# Patient Record
Sex: MALE | Race: WHITE | NOT HISPANIC OR LATINO | Employment: FULL TIME | URBAN - METROPOLITAN AREA
[De-identification: names, ages, dates, MRNs, and addresses within clinical notes are randomized per-mention and may not be internally consistent; named-entity substitution may affect disease eponyms.]

---

## 2018-05-29 ENCOUNTER — APPOINTMENT (OUTPATIENT)
Dept: GENERAL RADIOLOGY | Facility: CLINIC | Age: 28
End: 2018-05-29
Attending: EMERGENCY MEDICINE
Payer: COMMERCIAL

## 2018-05-29 ENCOUNTER — HOSPITAL ENCOUNTER (EMERGENCY)
Facility: CLINIC | Age: 28
Discharge: HOME | End: 2018-05-29
Attending: EMERGENCY MEDICINE | Admitting: EMERGENCY MEDICINE
Payer: COMMERCIAL

## 2018-05-29 VITALS
BODY MASS INDEX: 33.27 KG/M2 | HEIGHT: 67 IN | DIASTOLIC BLOOD PRESSURE: 88 MMHG | SYSTOLIC BLOOD PRESSURE: 142 MMHG | RESPIRATION RATE: 18 BRPM | TEMPERATURE: 100.1 F | WEIGHT: 212 LBS | OXYGEN SATURATION: 96 %

## 2018-05-29 DIAGNOSIS — J45.21 MILD INTERMITTENT ASTHMA WITH EXACERBATION: ICD-10-CM

## 2018-05-29 DIAGNOSIS — R05.9 COUGH: ICD-10-CM

## 2018-05-29 DIAGNOSIS — R25.2 CRAMP OF LIMB: ICD-10-CM

## 2018-05-29 LAB
ALBUMIN SERPL-MCNC: 4 G/DL (ref 3.4–5)
ALBUMIN UR-MCNC: NEGATIVE MG/DL
ALP SERPL-CCNC: 88 U/L (ref 40–150)
ALT SERPL W P-5'-P-CCNC: 47 U/L (ref 0–70)
ANION GAP SERPL CALCULATED.3IONS-SCNC: 8 MMOL/L (ref 3–14)
APPEARANCE UR: CLEAR
AST SERPL W P-5'-P-CCNC: 27 U/L (ref 0–45)
BASOPHILS # BLD AUTO: 0.1 10E9/L (ref 0–0.2)
BASOPHILS NFR BLD AUTO: 0.7 %
BILIRUB SERPL-MCNC: 0.4 MG/DL (ref 0.2–1.3)
BILIRUB UR QL STRIP: NEGATIVE
BUN SERPL-MCNC: 16 MG/DL (ref 7–30)
CALCIUM SERPL-MCNC: 8.9 MG/DL (ref 8.5–10.1)
CHLORIDE SERPL-SCNC: 102 MMOL/L (ref 94–109)
CO2 SERPL-SCNC: 28 MMOL/L (ref 20–32)
COLOR UR AUTO: YELLOW
CREAT SERPL-MCNC: 0.95 MG/DL (ref 0.66–1.25)
DIFFERENTIAL METHOD BLD: NORMAL
EOSINOPHIL # BLD AUTO: 0.1 10E9/L (ref 0–0.7)
EOSINOPHIL NFR BLD AUTO: 1.1 %
ERYTHROCYTE [DISTWIDTH] IN BLOOD BY AUTOMATED COUNT: 14 % (ref 10–15)
GFR SERPL CREATININE-BSD FRML MDRD: >90 ML/MIN/1.7M2
GLUCOSE SERPL-MCNC: 91 MG/DL (ref 70–99)
GLUCOSE UR STRIP-MCNC: NEGATIVE MG/DL
HCT VFR BLD AUTO: 42.1 % (ref 40–53)
HGB BLD-MCNC: 13.8 G/DL (ref 13.3–17.7)
HGB UR QL STRIP: NEGATIVE
IMM GRANULOCYTES # BLD: 0 10E9/L (ref 0–0.4)
IMM GRANULOCYTES NFR BLD: 0.6 %
KETONES UR STRIP-MCNC: NEGATIVE MG/DL
LEUKOCYTE ESTERASE UR QL STRIP: NEGATIVE
LIPASE SERPL-CCNC: 168 U/L (ref 73–393)
LYMPHOCYTES # BLD AUTO: 1.1 10E9/L (ref 0.8–5.3)
LYMPHOCYTES NFR BLD AUTO: 14.5 %
MCH RBC QN AUTO: 28 PG (ref 26.5–33)
MCHC RBC AUTO-ENTMCNC: 32.8 G/DL (ref 31.5–36.5)
MCV RBC AUTO: 86 FL (ref 78–100)
MONOCYTES # BLD AUTO: 0.7 10E9/L (ref 0–1.3)
MONOCYTES NFR BLD AUTO: 10.2 %
NEUTROPHILS # BLD AUTO: 5.3 10E9/L (ref 1.6–8.3)
NEUTROPHILS NFR BLD AUTO: 72.9 %
NITRATE UR QL: NEGATIVE
PH UR STRIP: 7 PH (ref 5–7)
PLATELET # BLD AUTO: 208 10E9/L (ref 150–450)
POTASSIUM SERPL-SCNC: 3.9 MMOL/L (ref 3.4–5.3)
PROT SERPL-MCNC: 7.4 G/DL (ref 6.8–8.8)
RBC # BLD AUTO: 4.92 10E12/L (ref 4.4–5.9)
SODIUM SERPL-SCNC: 138 MMOL/L (ref 133–144)
SOURCE: NORMAL
SP GR UR STRIP: 1.02 (ref 1–1.03)
UROBILINOGEN UR STRIP-MCNC: 0 MG/DL (ref 0–2)
WBC # BLD AUTO: 7.2 10E9/L (ref 4–11)

## 2018-05-29 PROCEDURE — 25000125 ZZHC RX 250: Performed by: EMERGENCY MEDICINE

## 2018-05-29 PROCEDURE — 96360 HYDRATION IV INFUSION INIT: CPT | Performed by: EMERGENCY MEDICINE

## 2018-05-29 PROCEDURE — 71046 X-RAY EXAM CHEST 2 VIEWS: CPT

## 2018-05-29 PROCEDURE — 99284 EMERGENCY DEPT VISIT MOD MDM: CPT | Mod: 25 | Performed by: EMERGENCY MEDICINE

## 2018-05-29 PROCEDURE — 81003 URINALYSIS AUTO W/O SCOPE: CPT | Performed by: EMERGENCY MEDICINE

## 2018-05-29 PROCEDURE — 99284 EMERGENCY DEPT VISIT MOD MDM: CPT | Mod: Z6 | Performed by: EMERGENCY MEDICINE

## 2018-05-29 PROCEDURE — 25000128 H RX IP 250 OP 636: Performed by: EMERGENCY MEDICINE

## 2018-05-29 PROCEDURE — 40000275 ZZH STATISTIC RCP TIME EA 10 MIN

## 2018-05-29 PROCEDURE — 94640 AIRWAY INHALATION TREATMENT: CPT

## 2018-05-29 PROCEDURE — 83690 ASSAY OF LIPASE: CPT | Performed by: EMERGENCY MEDICINE

## 2018-05-29 PROCEDURE — 85025 COMPLETE CBC W/AUTO DIFF WBC: CPT | Performed by: EMERGENCY MEDICINE

## 2018-05-29 PROCEDURE — 80053 COMPREHEN METABOLIC PANEL: CPT | Performed by: EMERGENCY MEDICINE

## 2018-05-29 RX ORDER — PREDNISONE 20 MG/1
40 TABLET ORAL DAILY
Qty: 10 TABLET | Refills: 0 | Status: SHIPPED | OUTPATIENT
Start: 2018-05-29 | End: 2018-06-03

## 2018-05-29 RX ORDER — IPRATROPIUM BROMIDE AND ALBUTEROL SULFATE 2.5; .5 MG/3ML; MG/3ML
3 SOLUTION RESPIRATORY (INHALATION) ONCE
Status: COMPLETED | OUTPATIENT
Start: 2018-05-29 | End: 2018-05-29

## 2018-05-29 RX ADMIN — SODIUM CHLORIDE 1000 ML: 9 INJECTION, SOLUTION INTRAVENOUS at 15:50

## 2018-05-29 RX ADMIN — IPRATROPIUM BROMIDE AND ALBUTEROL SULFATE 3 ML: .5; 3 SOLUTION RESPIRATORY (INHALATION) at 15:59

## 2018-05-29 ASSESSMENT — ENCOUNTER SYMPTOMS
DYSURIA: 0
VOMITING: 1
ABDOMINAL PAIN: 0
NAUSEA: 0
DIFFICULTY URINATING: 0
COUGH: 1

## 2018-05-29 NOTE — ED PROVIDER NOTES
History     Chief Complaint   Patient presents with     Dehydration     cough, muscle cramps, dizzy, weak     HPI  Gianluca Ortiz is a 27 year old male who presents to the emergency department via private car for evaluation of possible dehydration.  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  Due to the recent heat he has been drinking 2 bottles of Body Armor and using a gallon jug to drink water.  He believes he is dehydrated and left work to come to the emergency department.  He feels he should be evaluated before any loss of consciousness happens.  He also reports a cough that began 4 days ago.  He had one episode of coughing so hard he vomited but does not state when this was. His cough is producing a white phlegm and causing him to see stars and get light-heading.  His symptoms are accompanied with muscle cramps in his back and legs.  Patient reports history of asthma with an inhaler use.  He has used this recently but does not find relief.  Patient denies urinary symptoms, abdominal pain and chest pain . He also states he had an episode of dysuria yesterday.     Problem List:    There are no active problems to display for this patient.       Past Medical History:    No past medical history on file.    Past Surgical History:    No past surgical history on file.    Family History:    No family history on file.    Social History:  Marital Status:  Single [1]  Social History   Substance Use Topics     Smoking status: Former Smoker     Packs/day: 0.50     Years: 5.00     Types: Cigarettes     Smokeless tobacco: Current User     Alcohol use Yes        Medications:      amoxicillin-clavulanate (AUGMENTIN) 875-125 MG per tablet   diphenhydrAMINE (BENADRYL) 25 MG tablet   fluticasone (FLONASE) 50 MCG/ACT nasal spray   levocetirizine (XYZAL) 5 MG tablet         Review of Systems   Constitutional: Positive for activity change and fatigue. Negative for appetite change, chills, diaphoresis and fever.  "  HENT: Negative for congestion and trouble swallowing.    Respiratory: Positive for cough and wheezing. Negative for chest tightness and shortness of breath.    Cardiovascular: Negative for chest pain.   Gastrointestinal: Positive for vomiting (once due to cough). Negative for abdominal pain and nausea.   Genitourinary: Negative for decreased urine volume, difficulty urinating and dysuria.   Musculoskeletal: Positive for myalgias. Negative for arthralgias, gait problem and neck pain.   Skin: Negative for rash.   Neurological: Positive for light-headedness. Negative for dizziness, weakness, numbness and headaches.   Hematological: Does not bruise/bleed easily.   Psychiatric/Behavioral: Negative for confusion.       Physical Exam   BP: 141/81  Heart Rate: 104  Temp: 100.1  F (37.8  C)  Resp: 18  Height: 170.2 cm (5' 7\")  Weight: 96.2 kg (212 lb)  SpO2: 96 %      Physical Exam   Constitutional: He appears well-developed and well-nourished. No distress.   Eyes: Conjunctivae are normal.   Psychiatric: He has a normal mood and affect.   Nursing note and vitals reviewed.    HENT: Oral mucosa moist. No lesions.  Neck: Supple  Pulmonary/Chest: a wheeze in right upper lung with breath sounds slightly decrease at bases.  Cardiovascular: Heart is regular rate and rhythm. No murmur.  Abdomen: Soft, non-distended, non-tender.   Musculoskeletal: Moving all extremities well. No peripheral edema.   Neurological: Alert. No focal neurologic deficit.   Skin: No rash.   ED Course     ED Course     Procedures               Critical Care time:  none               Results for orders placed or performed during the hospital encounter of 05/29/18 (from the past 24 hour(s))   CBC with platelets, differential   Result Value Ref Range    WBC 7.2 4.0 - 11.0 10e9/L    RBC Count 4.92 4.4 - 5.9 10e12/L    Hemoglobin 13.8 13.3 - 17.7 g/dL    Hematocrit 42.1 40.0 - 53.0 %    MCV 86 78 - 100 fl    MCH 28.0 26.5 - 33.0 pg    MCHC 32.8 31.5 - 36.5 g/dL "    RDW 14.0 10.0 - 15.0 %    Platelet Count 208 150 - 450 10e9/L    Diff Method Automated Method     % Neutrophils 72.9 %    % Lymphocytes 14.5 %    % Monocytes 10.2 %    % Eosinophils 1.1 %    % Basophils 0.7 %    % Immature Granulocytes 0.6 %    Absolute Neutrophil 5.3 1.6 - 8.3 10e9/L    Absolute Lymphocytes 1.1 0.8 - 5.3 10e9/L    Absolute Monocytes 0.7 0.0 - 1.3 10e9/L    Absolute Eosinophils 0.1 0.0 - 0.7 10e9/L    Absolute Basophils 0.1 0.0 - 0.2 10e9/L    Abs Immature Granulocytes 0.0 0 - 0.4 10e9/L   Comprehensive metabolic panel   Result Value Ref Range    Sodium 138 133 - 144 mmol/L    Potassium 3.9 3.4 - 5.3 mmol/L    Chloride 102 94 - 109 mmol/L    Carbon Dioxide 28 20 - 32 mmol/L    Anion Gap 8 3 - 14 mmol/L    Glucose 91 70 - 99 mg/dL    Urea Nitrogen 16 7 - 30 mg/dL    Creatinine 0.95 0.66 - 1.25 mg/dL    GFR Estimate >90 >60 mL/min/1.7m2    GFR Estimate If Black >90 >60 mL/min/1.7m2    Calcium 8.9 8.5 - 10.1 mg/dL    Bilirubin Total 0.4 0.2 - 1.3 mg/dL    Albumin 4.0 3.4 - 5.0 g/dL    Protein Total 7.4 6.8 - 8.8 g/dL    Alkaline Phosphatase 88 40 - 150 U/L    ALT 47 0 - 70 U/L    AST 27 0 - 45 U/L   Lipase   Result Value Ref Range    Lipase 168 73 - 393 U/L   Chest XR,  PA & LAT    Narrative    CHEST TWO VIEWS 5/29/2018 4:13 PM     HISTORY: Cough.    COMPARISON: None.    FINDINGS: Heart size and pulmonary vascularity are within normal  limits. The lungs are clear. No pneumothorax or pleural effusion.       Impression    IMPRESSION: No radiographic evidence of acute chest abnormality.     SAILAJA MORRIS MD   UA reflex to Microscopic   Result Value Ref Range    Color Urine Yellow     Appearance Urine Clear     Glucose Urine Negative NEG^Negative mg/dL    Bilirubin Urine Negative NEG^Negative    Ketones Urine Negative NEG^Negative mg/dL    Specific Gravity Urine 1.023 1.003 - 1.035    Blood Urine Negative NEG^Negative    pH Urine 7.0 5.0 - 7.0 pH    Protein Albumin Urine Negative NEG^Negative mg/dL     Urobilinogen mg/dL 0.0 0.0 - 2.0 mg/dL    Nitrite Urine Negative NEG^Negative    Leukocyte Esterase Urine Negative NEG^Negative    Source Midstream Urine        Medications   ipratropium - albuterol 0.5 mg/2.5 mg/3 mL (DUONEB) neb solution 3 mL (3 mLs Nebulization Given 5/29/18 2880)   0.9% sodium chloride BOLUS (0 mLs Intravenous Stopped 5/29/18 2615)     3:19 PM Patient Assessed.  Assessments & Plan (with Medical Decision Making)records were reviewed. Labs were obtained. Patient was given a duoneb for his wheezing. IVF's were given to the patient. White count was not elevated and there was not a L shift. UA was unremarkable and the cmp was without abnormality. CXR without radiographic evidence of acute chest abnormality. Patient was observed for some time. He had no further wheezing on ascultation. HE was feeling better after the fluids.Findings discussed with patient. I am going to start his on a course of prednisone and have him use his inhaler . THis could be seasonal allergies . He may have had mild heat exposure but does not have any lab abnormalities. He will return if symptoms worsen or new symptoms develop.      I have reviewed the nursing notes.    I have reviewed the findings, diagnosis, plan and need for follow up with the patient.       Discharge Medication List as of 5/29/2018  5:41 PM      START taking these medications    Details   predniSONE (DELTASONE) 20 MG tablet Take 2 tablets (40 mg) by mouth daily for 5 days, Disp-10 tablet, R-0, Local Print             Final diagnoses:   Cough   Mild intermittent asthma with exacerbation   Cramp of limb     This document serves as a record of the services and decisions personally performed and made by Edvin Bains MD. It was created on HIS/HER behalf by Daysi Luque, a trained medical scribe. The creation of this document is based the provider's statements to the medical scribe.  Daysi Luque 3:14 PM 5/29/2018    Provider:   The information in  this document, created by the medical scribe for me, accurately reflects the services I personally performed and the decisions made by me. I have reviewed and approved this document for accuracy prior to leaving the patient care area.  Edvin Bains MD 3:14 PM 5/29/2018 5/29/2018   [facility] EMERGENCY DEPARTMENT     Edvin Bains MD  05/30/18 0907

## 2018-05-29 NOTE — ED NOTES
"Pt here with a cough that started on Friday and feeling weak/fatigued. Cough is producing white phlegm. Pt states he has been drinking 1 gallon of water per day but still feels dehydrated- getting muscle cramps. He states when he coughs he \"sees stars and gets lightheaded\". Pt is a - outside a lot. Hx of asthma- uses rescue inhaler that hasn't helped his breathing.  "

## 2018-05-29 NOTE — ED AVS SNAPSHOT
[facility] Emergency Department    [address] [facility] [address]    Phone:  [phone]    Fax:  [phone]                                       Gianluca Ortiz   MRN: [id number]    Department:  [facility] Emergency Department   Date of Visit:  5/29/2018           Patient Information     Date Of Birth          1990        Your diagnoses for this visit were:     Cough     Mild intermittent asthma with exacerbation     Cramp of limb        You were seen by Edvin Bains MD.      Follow-up Information     Follow up with Miroslava Restrepo APRN CNP.    Specialty:  Nurse Practitioner    Why:  Later this week for recheck    Contact information:    [address] [place] [address]  [phone]          Follow up with [facility] Emergency Department.    Specialty:  EMERGENCY MEDICINE    Why:  If symptoms worsen    Contact information:    [address] [place] [address] [phone]    Additional information:    The medical center is located at   [address] [place] [address] (between [address] in [place], four miles north   of [place]).        Discharge Instructions       Return if symptoms worsen or new symptoms develop.  Follow-up with primary care physician next available.  Drink plenty of fluids.  Take prednisone as directed.  If increased shortness of breath, wheezing, body aches or other symptoms present please return for further evaluation and care.  Asthma (Adult)  Asthma is a disease where the medium and  small air passages within the lung go into spasm and restrict the flow of air. Inflammation and swelling of the airways cause further blockage. During an acute asthma attack, these factors cause trouble breathing, wheezing, cough and chest tightness.    An asthma attack can be triggered by many things. Common triggers include infections such as the common cold, bronchitis, and pneumonia. Irritants such as smoke or pollutants in the air,  "very cold air, emotional upset, and exercise can also trigger an attack. In many adults with asthma, allergies to dust, mold, pollen and animal dander can cause an asthma attack. Skipping doses of daily asthma medicine can also bring on an asthma attack.  Asthma can be controlled using the proper medicines prescribed by your healthcare provider and avoiding exposure to known triggers including allergens and irritants.  Home care    Take prescribed medicine exactly at the times advised. If you need medicine such as from a hand held inhaler or aerosol breathing machine more than every 4 hours, contact your healthcare provider or seek immediate medical attention. If prescribed an antibiotic or prednisone, take all of the medicine as prescribed, even if you are feeling better after a few days.    Don't smoke. Avoid being exposed to the smoke of others.    Some people with asthma have worsening of their symptoms when they take aspirin and non-steroidal or fever-reducing medicines like ibuprofen and naproxen. Talk to your healthcare provider if you think this may apply to you.  Follow-up care  Follow up with your healthcare provider, or as advised. Always bring all of your current medicines to any appointments with your healthcare provider. Also bring a complete list of medicines even those not taken for asthma. If you don't already have one, talk to your healthcare provider about developing your own \"Asthma Action Plan.\"  A pneumococcal (pneumonia) vaccine and yearly flu shot (every fall) are recommended. Ask your doctor about this.  When to seek medical advice  Call your healthcare provider right away if any of these occur:     Increased wheezing or shortness of breath    Need to use your inhalers more often than usual without relief    Fever of 100.4 F (38 C) or higher, or as directed by your healthcare provider    Coughing up lots of dark-colored or bloody sputum (mucus)    Chest pain with each breath    If you use a " peak flow meter as part of an Asthma Action Plan, and you are still in the yellow zone (50% to 80%) 15 minutes after using inhaler medicine.  Call 911  Call 911 if any of the following occur    Trouble walking or talking because of shortness of breath    If you use a peak flow meter as part of an Asthma Action Plan and you are still in the red zone (less than 50%) 15 minutes after using inhaler medicine    Lips or fingernails turning gray or blue  Date Last Reviewed: 5/1/2017 2000-2017 [employer]. [address] [place] [id number]. All rights reserved. This information is not intended as a substitute for professional medical care. Always follow your healthcare professional's instructions.          Heat Cramps  Heat cramps are muscle cramps caused by intense exercise. They often occur in the heat, but can also occur in cooler temperatures. They are sometimes called  exercise-associated muscle cramps.  Symptoms include cramping of the muscles that comes on suddenly and causes severe pain. Cramping may last from minutes to hours. The cramps are thought to be caused by dehydration and loss of minerals in the body due to excessive sweating, but other factors can be involved. They are more common in people who are not used to heavy exercise or who are not used to exercising in hot or humid temperatures.  Heat cramps are treated by moving to a cool place, stretching the muscle, and drinking fluids containing salt. Severe heat cramps may need to be treated using IV (intravenous) fluids.  Home care    If cramping continues, drink electrolyte solution, sports drinks, or water with 2 teaspoons of salt per 8 ounces.     Avoid alcohol and caffeine.    Stretch and massage the painful muscle.  Preventing heat cramps:    Stay hydrated during exercise. Drink plenty of fluids containing electrolytes, such as sports drinks. If you are prone to heat cramps, add an extra 1/2 teaspoon of salt to each 8 ounces of  sports drink.    Work up gradually to exercising in hot or very humid conditions. Limit exercise on very hot days.    Avoid alcohol and caffeine before and during exercise.  Preventing other heat illness:    Protect yourself from the heat. Wear lightweight, light-colored clothing and a broad-brimmed hat.    Drink plenty of fluids before and during activity. Avoid alcohol and caffeine.    Limit exercise on hot or very humid days. If you have to be active in the heat, take frequent breaks to drink fluids and cool down.    Don't exercise when you're feeling ill.    Watch for symptoms of heat illness such as extreme tiredness, excess sweating, and dizziness. If any occur, move to a cool place, rest, and drink cool fluids. Lying down with your legs raised slightly can help you recover.  Follow-up care  Follow up with your healthcare provider, or as advised.  When to seek medical advice  Call your healthcare provider right away if any of these occur:    Inability to keep fluids down    Vomiting or diarrhea    Hot flushed skin    Worsening symptoms or new symptoms  Call 911  Call 911 for any of these symptoms of severe heat illness:    Confusion    Irrational behavior    Hallucinations    Trouble walking    Seizures    Passing out    Fever of 104 F (40 C) or higher  Date Last Reviewed: 5/1/2017 2000-2017 [employer]. [address] [place] [address], [place] [id number]. All rights reserved. This information is not intended as a substitute for professional medical care. Always follow your healthcare professional's instructions.          Chronic Cough with Uncertain Cause (Adult)    Everyone has had a cough as part of the common cold, flu, or bronchitis. This kind of cough occurs along with an achy feeling, low-grade fever, nasal and sinus congestion, and a scratchy or sore throat. This usually gets better in 2 to 3 weeks. A cough that lasts longer than 3 weeks may be due to other causes.  If your cough does not  improve over the next 2 weeks, further testing may be needed. Follow up with your healthcare provider as advised. Cough suppressants may be recommended. Based on your exam today, the exact cause of your cough is not certain. Below are some common causes for persistent cough.  Smoker's cough  Smoker s cough doesn t go away. If you continue to smoke, it only gets worse. The cough is from irritation in the air passages. Talk to your healthcare provider about quitting. Medicines or nicotine-replacement products, like gum or the patch, may make quitting easier.  Postnasal drip  A cough that is worse at night may be due to postnasal drip. Excess mucus in the nose drains from the back of your nose to your throat. This triggers the cough reflex. Postnasal drip may be due to a sinus infection or allergy. Common allergens include dust, tobacco smoke (both inhaled and secondhand smoke), environmental pollutants, pollen, mold, pets, cleaning agents, room deodorizers, and chemical fumes. Over-the-counter antihistamines or decongestants may be helpful for allergies. A sinus infection may requires antibiotic treatment. See your healthcare provider if symptoms continue.  Medicines  Certain prescribed medicines can cause a chronic cough in some people:    ACE inhibitors for high blood pressure. These include benazepril, captopril, enalapril, fosinopril, lisinopril, quinapril, ramipril, and others.    Beta-blockers for high blood pressure and other conditions. These include propranolol, atenolol, metoprolol, nadolol, and others.  Let your healthcare provider know if you are taking any of these.  Asthma  Cough may be the only sign of mild asthma. You may have tests to find out if asthma is causing your cough. You may also take asthma medicine on a trial basis.  Acid reflux (heartburn, GERD)  The esophagus is the tube that carries food from the mouth to the stomach. A valve at its lower end prevents stomach acids from flowing upward.  If this valve does not work properly, acid from the stomach enters the esophagus. This may cause a burning pain in the upper abdomen or lower chest, belching, or cough. Symptoms are often worse when lying flat. Avoid eating or drinking before bedtime. Try using extra pillows to raise your upper body, or place 4-inch blocks under the head of your bed. You may try an over-the-counter antacid or an acid-blocking medicine such as famotidine, cimetidine, ranitidine, esomeprazole, lansoprazole, or omeprazole. Stronger medicines for this condition can be prescribed by your healthcare provider.  Follow-up care  Follow up with your healthcare provider, or as advised, if your cough does not improve. Further testing may be needed.  Note: If an X-ray was taken, a specialist will review it. You will be notified of any new findings that may affect your care.  When to seek medical advice  Call your healthcare provider right away if any of these occur:    Mild wheezing or difficulty breathing    Fever of 100.4 F (38 C) or higher, or as directed by your healthcare provider    Unexpected weight loss    Coughing up large amounts of colored sputum    Night sweats (sheets and pajamas get soaking wet)  Call 911  Call 911 if any of these occur:    Coughing up blood    Moderate to severe trouble breathing or wheezing  Date Last Reviewed: 9/13/2015 2000-2017 [employer]. [address] [place] [address]. All rights reserved. This information is not intended as a substitute for professional medical care. Always follow your healthcare professional's instructions.          24 Hour Appointment Hotline       To make an appointment at any [facility] clinic, call [phone] ([phone]). If you don't have a family doctor or clinic, we will help you find one. [facility] clinics are conveniently located to serve the needs of you and your family.             Review of your medicines      START taking        Dose /  Directions Last dose taken    predniSONE 20 MG tablet   Commonly known as:  DELTASONE   Dose:  40 mg   Quantity:  10 tablet        Take 2 tablets (40 mg) by mouth daily for 5 days   Refills:  0          Our records show that you are taking the medicines listed below. If these are incorrect, please call your family doctor or clinic.        Dose / Directions Last dose taken    amoxicillin-clavulanate 875-125 MG per tablet   Commonly known as:  AUGMENTIN   Dose:  1 tablet   Quantity:  20 tablet        Take 1 tablet by mouth 2 times daily   Refills:  0        diphenhydrAMINE 25 MG tablet   Commonly known as:  BENADRYL   Dose:  25 mg   Quantity:  30 tablet        Take 1 tablet by mouth every 6 hours as needed for itching.   Refills:  0        fluticasone 50 MCG/ACT spray   Commonly known as:  FLONASE   Dose:  2 spray   Quantity:  16 g        Spray 2 sprays into both nostrils daily   Refills:  0        levocetirizine 5 MG tablet   Commonly known as:  XYZAL   Dose:  5 mg   Quantity:  30 tablet        Take 1 tablet (5 mg) by mouth every evening   Refills:  1                Prescriptions were sent or printed at these locations (1 Prescription)                   Other Prescriptions                Printed at Department/Unit printer (1 of 1)         predniSONE (DELTASONE) 20 MG tablet                Procedures and tests performed during your visit     CBC with platelets, differential    Chest XR,  PA & LAT    Comprehensive metabolic panel    Lipase    UA reflex to Microscopic      Orders Needing Specimen Collection     None      Pending Results     No orders found from 5/27/2018 to 5/30/2018.            Pending Culture Results     No orders found from 5/27/2018 to 5/30/2018.            Pending Results Instructions     If you had any lab results that were not finalized at the time of your Discharge, you can call the ED Lab Result RN at [phone]. You will be contacted by this team for any positive Lab results or changes in  treatment. The nurses are available 7 days a week from 10A to 6:30P.  You can leave a message 24 hours per day and they will return your call.        Test Results From Your Hospital Stay        5/29/2018  4:20 PM      Narrative     CHEST TWO VIEWS 5/29/2018 4:13 PM     HISTORY: Cough.    COMPARISON: None.    FINDINGS: Heart size and pulmonary vascularity are within normal  limits. The lungs are clear. No pneumothorax or pleural effusion.         Impression     IMPRESSION: No radiographic evidence of acute chest abnormality.     SAILAJA MORRIS MD         5/29/2018  3:50 PM      Component Results     Component Value Ref Range & Units Status    WBC 7.2 4.0 - 11.0 10e9/L Final    RBC Count 4.92 4.4 - 5.9 10e12/L Final    Hemoglobin 13.8 13.3 - 17.7 g/dL Final    Hematocrit 42.1 40.0 - 53.0 % Final    MCV 86 78 - 100 fl Final    MCH 28.0 26.5 - 33.0 pg Final    MCHC 32.8 31.5 - 36.5 g/dL Final    RDW 14.0 10.0 - 15.0 % Final    Platelet Count 208 150 - 450 10e9/L Final    Diff Method Automated Method  Final    % Neutrophils 72.9 % Final    % Lymphocytes 14.5 % Final    % Monocytes 10.2 % Final    % Eosinophils 1.1 % Final    % Basophils 0.7 % Final    % Immature Granulocytes 0.6 % Final    Absolute Neutrophil 5.3 1.6 - 8.3 10e9/L Final    Absolute Lymphocytes 1.1 0.8 - 5.3 10e9/L Final    Absolute Monocytes 0.7 0.0 - 1.3 10e9/L Final    Absolute Eosinophils 0.1 0.0 - 0.7 10e9/L Final    Absolute Basophils 0.1 0.0 - 0.2 10e9/L Final    Abs Immature Granulocytes 0.0 0 - 0.4 10e9/L Final         5/29/2018  4:11 PM      Component Results     Component Value Ref Range & Units Status    Sodium 138 133 - 144 mmol/L Final    Potassium 3.9 3.4 - 5.3 mmol/L Final    Chloride 102 94 - 109 mmol/L Final    Carbon Dioxide 28 20 - 32 mmol/L Final    Anion Gap 8 3 - 14 mmol/L Final    Glucose 91 70 - 99 mg/dL Final    Urea Nitrogen 16 7 - 30 mg/dL Final    Creatinine 0.95 0.66 - 1.25 mg/dL Final    GFR Estimate >90 >60 mL/min/1.7m2  "Final    Non  GFR Calc    GFR Estimate If Black >90 >60 mL/min/1.7m2 Final    African American GFR Calc    Calcium 8.9 8.5 - 10.1 mg/dL Final    Bilirubin Total 0.4 0.2 - 1.3 mg/dL Final    Albumin 4.0 3.4 - 5.0 g/dL Final    Protein Total 7.4 6.8 - 8.8 g/dL Final    Alkaline Phosphatase 88 40 - 150 U/L Final    ALT 47 0 - 70 U/L Final    AST 27 0 - 45 U/L Final         5/29/2018  4:09 PM      Component Results     Component Value Ref Range & Units Status    Lipase 168 73 - 393 U/L Final         5/29/2018  5:06 PM      Component Results     Component Value Ref Range & Units Status    Color Urine Yellow  Final    Appearance Urine Clear  Final    Glucose Urine Negative NEG^Negative mg/dL Final    Bilirubin Urine Negative NEG^Negative Final    Ketones Urine Negative NEG^Negative mg/dL Final    Specific Gravity Urine 1.023 1.003 - 1.035 Final    Blood Urine Negative NEG^Negative Final    pH Urine 7.0 5.0 - 7.0 pH Final    Protein Albumin Urine Negative NEG^Negative mg/dL Final    Urobilinogen mg/dL 0.0 0.0 - 2.0 mg/dL Final    Nitrite Urine Negative NEG^Negative Final    Leukocyte Esterase Urine Negative NEG^Negative Final    Source Midstream Urine  Final                Thank you for choosing [facility]       Thank you for choosing [facility] for your care. Our goal is always to provide you with excellent care. Hearing back from our patients is one way we can continue to improve our services. Please take a few minutes to complete the written survey that you may receive in the mail after you visit with us. Thank you!        [employer] Information     [facility] lets you send messages to your doctor, view your test results, renew your prescriptions, schedule appointments and more. To sign up, go to [email or web address] . Click on \"Log in\" on the left side of the screen, which will take you to the Welcome page. Then click on \"Sign up Now\" on the right side of the page.     You will be asked to enter the access " code listed below, as well as some personal information. Please follow the directions to create your username and password.     Your access code is: [email or web address]  Expires: 2018  5:41 PM     Your access code will  in 90 days. If you need help or a new code, please call your [facility] clinic or [phone].        Care EveryWhere ID     This is your Care EveryWhere ID. This could be used by other organizations to access your [facility] medical records  [id number]        Equal Access to Services     [facility] : Charlotte Patel, wareshma jones, qasteffany kaalbryanna barreto, memo penny . So [facility] [phone].    ATENCIÓN: Si habla español, tiene a george disposición servicios gratuitos de asistencia lingüística. Llame al [phone].    We comply with applicable federal civil rights laws and [place] laws. We do not discriminate on the basis of race, color, national origin, age, disability, sex, sexual orientation, or gender identity.            After Visit Summary       This is your record. Keep this with you and show to your community pharmacist(s) and doctor(s) at your next visit.

## 2018-05-29 NOTE — DISCHARGE INSTRUCTIONS
Return if symptoms worsen or new symptoms develop.  Follow-up with primary care physician next available.  Drink plenty of fluids.  Take prednisone as directed.  If increased shortness of breath, wheezing, body aches or other symptoms present please return for further evaluation and care.  Asthma (Adult)  Asthma is a disease where the medium and  small air passages within the lung go into spasm and restrict the flow of air. Inflammation and swelling of the airways cause further blockage. During an acute asthma attack, these factors cause trouble breathing, wheezing, cough and chest tightness.    An asthma attack can be triggered by many things. Common triggers include infections such as the common cold, bronchitis, and pneumonia. Irritants such as smoke or pollutants in the air, very cold air, emotional upset, and exercise can also trigger an attack. In many adults with asthma, allergies to dust, mold, pollen and animal dander can cause an asthma attack. Skipping doses of daily asthma medicine can also bring on an asthma attack.  Asthma can be controlled using the proper medicines prescribed by your healthcare provider and avoiding exposure to known triggers including allergens and irritants.  Home care    Take prescribed medicine exactly at the times advised. If you need medicine such as from a hand held inhaler or aerosol breathing machine more than every 4 hours, contact your healthcare provider or seek immediate medical attention. If prescribed an antibiotic or prednisone, take all of the medicine as prescribed, even if you are feeling better after a few days.    Don't smoke. Avoid being exposed to the smoke of others.    Some people with asthma have worsening of their symptoms when they take aspirin and non-steroidal or fever-reducing medicines like ibuprofen and naproxen. Talk to your healthcare provider if you think this may apply to you.  Follow-up care  Follow up with your healthcare provider, or as  "advised. Always bring all of your current medicines to any appointments with your healthcare provider. Also bring a complete list of medicines even those not taken for asthma. If you don't already have one, talk to your healthcare provider about developing your own \"Asthma Action Plan.\"  A pneumococcal (pneumonia) vaccine and yearly flu shot (every fall) are recommended. Ask your doctor about this.  When to seek medical advice  Call your healthcare provider right away if any of these occur:     Increased wheezing or shortness of breath    Need to use your inhalers more often than usual without relief    Fever of 100.4 F (38 C) or higher, or as directed by your healthcare provider    Coughing up lots of dark-colored or bloody sputum (mucus)    Chest pain with each breath    If you use a peak flow meter as part of an Asthma Action Plan, and you are still in the yellow zone (50% to 80%) 15 minutes after using inhaler medicine.  Call 911  Call 911 if any of the following occur    Trouble walking or talking because of shortness of breath    If you use a peak flow meter as part of an Asthma Action Plan and you are still in the red zone (less than 50%) 15 minutes after using inhaler medicine    Lips or fingernails turning gray or blue  Date Last Reviewed: 5/1/2017 2000-2017 [employer]. [address] [place] [address], [place] [address]. All rights reserved. This information is not intended as a substitute for professional medical care. Always follow your healthcare professional's instructions.          Heat Cramps  Heat cramps are muscle cramps caused by intense exercise. They often occur in the heat, but can also occur in cooler temperatures. They are sometimes called  exercise-associated muscle cramps.  Symptoms include cramping of the muscles that comes on suddenly and causes severe pain. Cramping may last from minutes to hours. The cramps are thought to be caused by dehydration and loss of minerals in the body " due to excessive sweating, but other factors can be involved. They are more common in people who are not used to heavy exercise or who are not used to exercising in hot or humid temperatures.  Heat cramps are treated by moving to a cool place, stretching the muscle, and drinking fluids containing salt. Severe heat cramps may need to be treated using IV (intravenous) fluids.  Home care    If cramping continues, drink electrolyte solution, sports drinks, or water with 2 teaspoons of salt per 8 ounces.     Avoid alcohol and caffeine.    Stretch and massage the painful muscle.  Preventing heat cramps:    Stay hydrated during exercise. Drink plenty of fluids containing electrolytes, such as sports drinks. If you are prone to heat cramps, add an extra 1/2 teaspoon of salt to each 8 ounces of sports drink.    Work up gradually to exercising in hot or very humid conditions. Limit exercise on very hot days.    Avoid alcohol and caffeine before and during exercise.  Preventing other heat illness:    Protect yourself from the heat. Wear lightweight, light-colored clothing and a broad-brimmed hat.    Drink plenty of fluids before and during activity. Avoid alcohol and caffeine.    Limit exercise on hot or very humid days. If you have to be active in the heat, take frequent breaks to drink fluids and cool down.    Don't exercise when you're feeling ill.    Watch for symptoms of heat illness such as extreme tiredness, excess sweating, and dizziness. If any occur, move to a cool place, rest, and drink cool fluids. Lying down with your legs raised slightly can help you recover.  Follow-up care  Follow up with your healthcare provider, or as advised.  When to seek medical advice  Call your healthcare provider right away if any of these occur:    Inability to keep fluids down    Vomiting or diarrhea    Hot flushed skin    Worsening symptoms or new symptoms  Call 911  Call 911 for any of these symptoms of severe heat  illness:    Confusion    Irrational behavior    Hallucinations    Trouble walking    Seizures    Passing out    Fever of 104 F (40 C) or higher  Date Last Reviewed: 5/1/2017 2000-2017 [employer]. [address] [place] [address], [place] [address]. All rights reserved. This information is not intended as a substitute for professional medical care. Always follow your healthcare professional's instructions.          Chronic Cough with Uncertain Cause (Adult)    Everyone has had a cough as part of the common cold, flu, or bronchitis. This kind of cough occurs along with an achy feeling, low-grade fever, nasal and sinus congestion, and a scratchy or sore throat. This usually gets better in 2 to 3 weeks. A cough that lasts longer than 3 weeks may be due to other causes.  If your cough does not improve over the next 2 weeks, further testing may be needed. Follow up with your healthcare provider as advised. Cough suppressants may be recommended. Based on your exam today, the exact cause of your cough is not certain. Below are some common causes for persistent cough.  Smoker's cough  Smoker s cough doesn t go away. If you continue to smoke, it only gets worse. The cough is from irritation in the air passages. Talk to your healthcare provider about quitting. Medicines or nicotine-replacement products, like gum or the patch, may make quitting easier.  Postnasal drip  A cough that is worse at night may be due to postnasal drip. Excess mucus in the nose drains from the back of your nose to your throat. This triggers the cough reflex. Postnasal drip may be due to a sinus infection or allergy. Common allergens include dust, tobacco smoke (both inhaled and secondhand smoke), environmental pollutants, pollen, mold, pets, cleaning agents, room deodorizers, and chemical fumes. Over-the-counter antihistamines or decongestants may be helpful for allergies. A sinus infection may requires antibiotic treatment. See your healthcare  provider if symptoms continue.  Medicines  Certain prescribed medicines can cause a chronic cough in some people:    ACE inhibitors for high blood pressure. These include benazepril, captopril, enalapril, fosinopril, lisinopril, quinapril, ramipril, and others.    Beta-blockers for high blood pressure and other conditions. These include propranolol, atenolol, metoprolol, nadolol, and others.  Let your healthcare provider know if you are taking any of these.  Asthma  Cough may be the only sign of mild asthma. You may have tests to find out if asthma is causing your cough. You may also take asthma medicine on a trial basis.  Acid reflux (heartburn, GERD)  The esophagus is the tube that carries food from the mouth to the stomach. A valve at its lower end prevents stomach acids from flowing upward. If this valve does not work properly, acid from the stomach enters the esophagus. This may cause a burning pain in the upper abdomen or lower chest, belching, or cough. Symptoms are often worse when lying flat. Avoid eating or drinking before bedtime. Try using extra pillows to raise your upper body, or place 4-inch blocks under the head of your bed. You may try an over-the-counter antacid or an acid-blocking medicine such as famotidine, cimetidine, ranitidine, esomeprazole, lansoprazole, or omeprazole. Stronger medicines for this condition can be prescribed by your healthcare provider.  Follow-up care  Follow up with your healthcare provider, or as advised, if your cough does not improve. Further testing may be needed.  Note: If an X-ray was taken, a specialist will review it. You will be notified of any new findings that may affect your care.  When to seek medical advice  Call your healthcare provider right away if any of these occur:    Mild wheezing or difficulty breathing    Fever of 100.4 F (38 C) or higher, or as directed by your healthcare provider    Unexpected weight loss    Coughing up large amounts of colored  sputum    Night sweats (sheets and pajamas get soaking wet)  Call 911  Call 911 if any of these occur:    Coughing up blood    Moderate to severe trouble breathing or wheezing  Date Last Reviewed: 9/13/2015 2000-2017 [employer]. [address] [place] [address]. All rights reserved. This information is not intended as a substitute for professional medical care. Always follow your healthcare professional's instructions.

## 2018-05-29 NOTE — ED AVS SNAPSHOT
[facility] Emergency Department    [address] [facility] [address]    Phone:  [phone]    Fax:  [phone]                                       Gianluca Ortiz   MRN: [id number]    Department:  [facility] Emergency Department   Date of Visit:  5/29/2018           After Visit Summary Signature Page     I have received my discharge instructions, and my questions have been answered. I have discussed any challenges I see with this plan with the nurse or doctor.    ..........................................................................................................................................  Patient/Patient Representative Signature      ..........................................................................................................................................  Patient Representative Print Name and Relationship to Patient    ..................................................               ................................................  Date                                            Time    ..........................................................................................................................................  Reviewed by Signature/Title    ...................................................              ..............................................  Date                                                            Time

## 2018-05-30 ASSESSMENT — ENCOUNTER SYMPTOMS
CHILLS: 0
DIAPHORESIS: 0
SHORTNESS OF BREATH: 0
WHEEZING: 1
TROUBLE SWALLOWING: 0
BRUISES/BLEEDS EASILY: 0
CHEST TIGHTNESS: 0
HEADACHES: 0
MYALGIAS: 1
DIZZINESS: 0
WEAKNESS: 0
ARTHRALGIAS: 0
FATIGUE: 1
LIGHT-HEADEDNESS: 1
APPETITE CHANGE: 0
CONFUSION: 0
FEVER: 0
NUMBNESS: 0
NECK PAIN: 0
ACTIVITY CHANGE: 1

## 2018-11-19 ENCOUNTER — HOSPITAL ENCOUNTER (EMERGENCY)
Facility: CLINIC | Age: 28
Discharge: HOME | End: 2018-11-19
Attending: NURSE PRACTITIONER | Admitting: NURSE PRACTITIONER
Payer: COMMERCIAL

## 2018-11-19 ENCOUNTER — APPOINTMENT (OUTPATIENT)
Dept: GENERAL RADIOLOGY | Facility: CLINIC | Age: 28
End: 2018-11-19
Attending: NURSE PRACTITIONER
Payer: COMMERCIAL

## 2018-11-19 VITALS
HEIGHT: 66 IN | RESPIRATION RATE: 14 BRPM | HEART RATE: 81 BPM | SYSTOLIC BLOOD PRESSURE: 139 MMHG | BODY MASS INDEX: 34.22 KG/M2 | TEMPERATURE: 98.6 F | OXYGEN SATURATION: 96 % | DIASTOLIC BLOOD PRESSURE: 89 MMHG

## 2018-11-19 DIAGNOSIS — R05.3 PERSISTENT COUGH: ICD-10-CM

## 2018-11-19 DIAGNOSIS — J45.21 MILD INTERMITTENT ASTHMA WITH EXACERBATION: ICD-10-CM

## 2018-11-19 PROCEDURE — G0463 HOSPITAL OUTPT CLINIC VISIT: HCPCS | Mod: 25 | Performed by: NURSE PRACTITIONER

## 2018-11-19 PROCEDURE — 99214 OFFICE O/P EST MOD 30 MIN: CPT | Mod: Z6 | Performed by: NURSE PRACTITIONER

## 2018-11-19 PROCEDURE — 71046 X-RAY EXAM CHEST 2 VIEWS: CPT

## 2018-11-19 RX ORDER — PREDNISONE 20 MG/1
60 TABLET ORAL DAILY
Qty: 15 TABLET | Refills: 0 | Status: SHIPPED | OUTPATIENT
Start: 2018-11-19 | End: 2018-12-26

## 2018-11-19 RX ORDER — ALBUTEROL SULFATE 90 UG/1
2 AEROSOL, METERED RESPIRATORY (INHALATION)
COMMUNITY
Start: 2016-10-31 | End: 2018-11-19

## 2018-11-19 RX ORDER — ALBUTEROL SULFATE 90 UG/1
2 AEROSOL, METERED RESPIRATORY (INHALATION) EVERY 6 HOURS PRN
Qty: 1 INHALER | Refills: 0 | Status: SHIPPED | OUTPATIENT
Start: 2018-11-19

## 2018-11-19 NOTE — ED AVS SNAPSHOT
[facility] Emergency Department    [address] [facility] [address]    Phone:  [phone]    Fax:  [phone]                                       Gianluca Ortiz   MRN: [id number]    Department:  [facility] Emergency Department   Date of Visit:  11/19/2018           Patient Information     Date Of Birth          1990        Your diagnoses for this visit were:     Persistent cough     Mild intermittent asthma with exacerbation        You were seen by Miroslava Smith APRN CNP.        Discharge Instructions         Controlling Asthma Triggers: Irritants  Irritants are things in the air that can trigger symptoms in some people with asthma or COPD. Below are some common irritants. Some are tiny particles and others are dissolved in the air. You will also find tips to help you stay away from them.  Smoke  This is from cigarettes, cigars, pipes, barbecues, outdoor campfires, and fireplaces.    Don t smoke. And don t let people smoke in your home or car.    When you travel, ask for rental cars and hotel rooms that are smoke-free.    Stay away from fireplaces and wood stoves. If you can t, sit away from them. Make sure the smoke goes outside.    Don t burn incense or use candles.    Move away from smoky outdoor cooking grills.  Smog  This is from car exhaust and other pollution.    Read or listen to local air quality reports. These let you know when air quality is poor.    Stay indoors as much as you can on smoggy days. If possible, use air conditioning instead of opening the windows. Air conditioners filter the air. The filter needs to be cleaned regularly.    In your car, set air conditioning to use air only inside the car. This will let in less pollution.  Strong odors  These are from air fresheners, deodorizers, and cleaning products. They are also from perfumes, deodorants, and other beauty products. Strong odors also come from incense and candles, and insect sprays and other  sprays. The chlorine in swimming pools can affect some people.    Use products with no scent. An example is scent-free deodorant or body lotion.    Do not use products with bleach and ammonia for cleaning. Try making a cleaning solution with white vinegar, baking soda, or mild dish soap.    Use exhaust fans while cooking. Or open a window if you can.    Do not use perfumes, air fresheners, potpourri, and other scented products.  Other irritants  These are dust, aerosol sprays, and fine powders.    Wear a mask while doing tasks like sanding, dusting, sweeping, and yardwork. Make sure any indoor work area is well-ventilated. Open doors and windows when doing these tasks.    Use pump spray bottles instead of aerosols.    Pour liquid  instead of spraying them. For example, instead of spraying a window with , pour some on a rag or cloth.  If you have a quick-relief inhaler, carry it with you at all times. If you can t avoid an area with irritants, watch for symptoms. If you have symptoms, leave the area and use your quick-relief inhaler as directed.   Date Last Reviewed: 10/1/2016    7182-7198 [employer]. [address] [place] [address], [place] [address]. All rights reserved. This information is not intended as a substitute for professional medical care. Always follow your healthcare professional's instructions.          24 Hour Appointment Hotline       To make an appointment at any [facility], call [phone] ([phone]). If you don't have a family doctor or clinic, we will help you find one. [facility] are conveniently located to serve the needs of you and your family.             Review of your medicines      START taking        Dose / Directions Last dose taken    predniSONE 20 MG tablet   Commonly known as:  DELTASONE   Dose:  60 mg   Quantity:  15 tablet        Take 3 tablets (60 mg) by mouth daily   Refills:  0          CONTINUE these medicines which may have CHANGED, or  have new prescriptions. If we are uncertain of the size of tablets/capsules you have at home, strength may be listed as something that might have changed.        Dose / Directions Last dose taken    albuterol 108 (90 Base) MCG/ACT inhaler   Commonly known as:  PROAIR HFA/PROVENTIL HFA/VENTOLIN HFA   Dose:  2 puff   What changed:    - when to take this  - reasons to take this   Quantity:  1 Inhaler        Inhale 2 puffs into the lungs every 6 hours as needed for shortness of breath / dyspnea or wheezing   Refills:  0          Our records show that you are taking the medicines listed below. If these are incorrect, please call your family doctor or clinic.        Dose / Directions Last dose taken    diphenhydrAMINE 25 MG tablet   Commonly known as:  BENADRYL   Dose:  25 mg   Quantity:  30 tablet        Take 1 tablet by mouth every 6 hours as needed for itching.   Refills:  0                Prescriptions were sent or printed at these locations (2 Prescriptions)                   [facility] [address] [facility] [facility]   [address] [facility] [address]    Telephone:  [phone]   Fax:  [phone]   Hours:                  E-Prescribed (2 of 2)         albuterol (PROAIR HFA/PROVENTIL HFA/VENTOLIN HFA) 108 (90 Base) MCG/ACT inhaler               predniSONE (DELTASONE) 20 MG tablet                Procedures and tests performed during your visit     XR Chest 2 Views      Orders Needing Specimen Collection     None      Pending Results     No orders found from 11/17/2018 to 11/20/2018.            Pending Culture Results     No orders found from 11/17/2018 to 11/20/2018.            Pending Results Instructions     If you had any lab results that were not finalized at the time of your Discharge, you can call the ED Lab Result RN at [phone]. You will be contacted by this team for any positive Lab results or changes in treatment. The nurses are available 7 days a week from 10A to 6:30P.  You can  "leave a message 24 hours per day and they will return your call.        Test Results From Your Hospital Stay        2018  5:11 PM      Narrative     XR CHEST 2 VW 2018 5:05 PM    HISTORY: Cough.     COMPARISON: May 29, 2018.         Impression     IMPRESSION: The lungs are clear. No focal pulmonary opacities. Heart  and mediastinum are unremarkable. No acute cardiopulmonary  abnormalities.    KAI RENEE MD                Thank you for choosing [facility]       Thank you for choosing [facility] for your care. Our goal is always to provide you with excellent care. Hearing back from our patients is one way we can continue to improve our services. Please take a few minutes to complete the written survey that you may receive in the mail after you visit with us. Thank you!        "[employer]hart Information     [employer] lets you send messages to your doctor, view your test results, renew your prescriptions, schedule appointments and more. To sign up, go to [email or web address] . Click on \"Log in\" on the left side of the screen, which will take you to the Welcome page. Then click on \"Sign up Now\" on the right side of the page.     You will be asked to enter the access code listed below, as well as some personal information. Please follow the directions to create your username and password.     Your access code is: [email or web address]  Expires: 2019  5:37 PM     Your access code will  in 90 days. If you need help or a new code, please call your [facility] clinic or [phone].        Care EveryWhere ID     This is your Care EveryWhere ID. This could be used by other organizations to access your [facility] medical records  [id number]        Equal Access to Services     [facility]: Hadii alyse Patel, waaxda luqadaha, qaybta kaalbryanna barreto, memo garcia. So [facility] [phone].    ATENCIÓN: Si habla español, tiene a george disposición servicios gratuitos de asistencia " camilla Aroramarcelino al [phone].    We comply with applicable federal civil rights laws and [place] laws. We do not discriminate on the basis of race, color, national origin, age, disability, sex, sexual orientation, or gender identity.            After Visit Summary       This is your record. Keep this with you and show to your community pharmacist(s) and doctor(s) at your next visit.

## 2018-11-19 NOTE — DISCHARGE INSTRUCTIONS
Controlling Asthma Triggers: Irritants  Irritants are things in the air that can trigger symptoms in some people with asthma or COPD. Below are some common irritants. Some are tiny particles and others are dissolved in the air. You will also find tips to help you stay away from them.  Smoke  This is from cigarettes, cigars, pipes, barbecues, outdoor campfires, and fireplaces.    Don t smoke. And don t let people smoke in your home or car.    When you travel, ask for rental cars and hotel rooms that are smoke-free.    Stay away from fireplaces and wood stoves. If you can t, sit away from them. Make sure the smoke goes outside.    Don t burn incense or use candles.    Move away from smoky outdoor cooking grills.  Smog  This is from car exhaust and other pollution.    Read or listen to local air quality reports. These let you know when air quality is poor.    Stay indoors as much as you can on smoggy days. If possible, use air conditioning instead of opening the windows. Air conditioners filter the air. The filter needs to be cleaned regularly.    In your car, set air conditioning to use air only inside the car. This will let in less pollution.  Strong odors  These are from air fresheners, deodorizers, and cleaning products. They are also from perfumes, deodorants, and other beauty products. Strong odors also come from incense and candles, and insect sprays and other sprays. The chlorine in swimming pools can affect some people.    Use products with no scent. An example is scent-free deodorant or body lotion.    Do not use products with bleach and ammonia for cleaning. Try making a cleaning solution with white vinegar, baking soda, or mild dish soap.    Use exhaust fans while cooking. Or open a window if you can.    Do not use perfumes, air fresheners, potpourri, and other scented products.  Other irritants  These are dust, aerosol sprays, and fine powders.    Wear a mask while doing tasks like sanding, dusting,  sweeping, and yardwork. Make sure any indoor work area is well-ventilated. Open doors and windows when doing these tasks.    Use pump spray bottles instead of aerosols.    Pour liquid  instead of spraying them. For example, instead of spraying a window with , pour some on a rag or cloth.  If you have a quick-relief inhaler, carry it with you at all times. If you can t avoid an area with irritants, watch for symptoms. If you have symptoms, leave the area and use your quick-relief inhaler as directed.   Date Last Reviewed: 10/1/2016    7665-9403 [employer]. [address] [place] [address], [place] [address]. All rights reserved. This information is not intended as a substitute for professional medical care. Always follow your healthcare professional's instructions.

## 2018-11-19 NOTE — ED PROVIDER NOTES
"  History     Chief Complaint   Patient presents with     Cough     URI     HPI    SUBJECTIVE: Gianluca Ortiz  is here today because of:Cough, \"Cold\", \"Flu\" and nasal congestion  The patient has had symptoms of cough, nasal congestion/runny nose and upper chest tightness.   Onset of symptoms was 6 weeks ago. Course of illness is waxing and waning.  Patient states 6 weeks ago he was treated for influenza with Tamiflu and he reports he never got 100% better and he persistently has an aching cough that is tired some.  Patient denies exposure to illness at home or work/school.   Pt reports the cough is the worse and is described as raspy, deep, and sharp pains with coughing, mid-sternal, hourly, and worse with activity and worse with going out into cold air  Patient denies fever, earache, sore throat, diarrhea and facial pressure  Treatment measures tried include albuterol 2 puffs every 4 hours and this helpful temporarily.  Patient is not a smoker  Pt has asthma mild intermittent    Problem List:    There are no active problems to display for this patient.       Past Medical History:    History reviewed. No pertinent past medical history.  Asthma mild intermittent persistent    Past Surgical History:    History reviewed. No pertinent surgical history.    Family History:    No family history on file.    Social History:  Marital Status:  Single [1]  Social History   Substance Use Topics     Smoking status: Former Smoker     Packs/day: 0.50     Years: 5.00     Types: Cigarettes     Smokeless tobacco: Current User     Alcohol use Yes        Medications:      albuterol (PROAIR HFA/PROVENTIL HFA/VENTOLIN HFA) 108 (90 Base) MCG/ACT inhaler   predniSONE (DELTASONE) 20 MG tablet   diphenhydrAMINE (BENADRYL) 25 MG tablet   [DISCONTINUED] albuterol (PROAIR HFA/PROVENTIL HFA/VENTOLIN HFA) 108 (90 Base) MCG/ACT inhaler       Review of Systems  As mentioned above in the history present illness. All other systems were reviewed " "and are negative.    Physical Exam   BP: 139/89  Pulse: 81  Temp: 98.6  F (37  C)  Resp: 14  Height: 167.6 cm (5' 6\")  SpO2: 96 %      Physical Exam  GENERAL: alert, no acute distress and mildly ill appearing  EYES:  Right conjunctiva is not injected and without discharge.  Left conjunctiva is not injected and without discharge.  EARS: Right TM is normal: no effusions, no erythema, and normal landmarks.  Left TM is normal: no effusions, no erythema, and normal landmarks.  NOSE: Nasal mucosa is normal.  Sinus not tender.  THROAT: normal and exudate absent, uvula midline, trismus absent.  NECK: supple with no adenopathy  CARDIAC:NORMAL - regular rate and rhythm without murmur.  RESP: ABNORMAL - distant breath sounds and faint wheezing in middle lobes  SKIN: normal    ED Course     ED Course     Procedures           Results for orders placed or performed during the hospital encounter of 11/19/18 (from the past 24 hour(s))   XR Chest 2 Views    Narrative    XR CHEST 2 VW 11/19/2018 5:05 PM    HISTORY: Cough.     COMPARISON: May 29, 2018.       Impression    IMPRESSION: The lungs are clear. No focal pulmonary opacities. Heart  and mediastinum are unremarkable. No acute cardiopulmonary  abnormalities.    KAI RENEE MD       Medications - No data to display    Assessments & Plan (with Medical Decision Making)     I have reviewed the nursing notes.    I have reviewed the findings, diagnosis, plan and need for follow up with the patient.    Medical Decision Making:  CXR is indicated and no evidence of pneumonia.  Rapid Strep test is not indicated.     Assessment:  1) Asthma exacerbation.    PLAN:  Prednisone 60 mg daily for 5 days and refilled albuterol inhaler.  Encourage patient to follow-up if symptoms are persistent or worsening.  Patient discharged in stable condition.  Use acetaminophen, increase fluids and rest.   Follow up with any questions or problems      Discharge Medication List as of 11/19/2018  5:37 PM    "   START taking these medications    Details   predniSONE (DELTASONE) 20 MG tablet Take 3 tablets (60 mg) by mouth daily, Disp-15 tablet, R-0, E-Prescribe             Final diagnoses:   Persistent cough   Mild intermittent asthma with exacerbation       11/19/2018   [facility] EMERGENCY DEPARTMENT     Miroslava Smith, APURVA CNP  11/19/18 3505

## 2018-11-19 NOTE — ED AVS SNAPSHOT
[facility] Emergency Department    [address] [address]    Phone:  [phone]    Fax:  [phone]                                       Gianluca Ortiz   MRN: [id number]    Department:  [facility] Emergency Department   Date of Visit:  11/19/2018           After Visit Summary Signature Page     I have received my discharge instructions, and my questions have been answered. I have discussed any challenges I see with this plan with the nurse or doctor.    ..........................................................................................................................................  Patient/Patient Representative Signature      ..........................................................................................................................................  Patient Representative Print Name and Relationship to Patient    ..................................................               ................................................  Date                                   Time    ..........................................................................................................................................  Reviewed by Signature/Title    ...................................................              ..............................................  Date                                               Time          22EPIC Rev 08/18

## 2018-11-26 ENCOUNTER — APPOINTMENT (OUTPATIENT)
Dept: CT IMAGING | Facility: CLINIC | Age: 28
End: 2018-11-26
Attending: PHYSICIAN ASSISTANT
Payer: COMMERCIAL

## 2018-11-26 ENCOUNTER — HOSPITAL ENCOUNTER (EMERGENCY)
Facility: CLINIC | Age: 28
Discharge: HOME | End: 2018-11-26
Attending: PHYSICIAN ASSISTANT | Admitting: PHYSICIAN ASSISTANT
Payer: COMMERCIAL

## 2018-11-26 VITALS
WEIGHT: 220 LBS | SYSTOLIC BLOOD PRESSURE: 146 MMHG | TEMPERATURE: 98.2 F | RESPIRATION RATE: 16 BRPM | HEIGHT: 67 IN | BODY MASS INDEX: 34.53 KG/M2 | OXYGEN SATURATION: 95 % | DIASTOLIC BLOOD PRESSURE: 76 MMHG

## 2018-11-26 DIAGNOSIS — R11.2 NAUSEA, VOMITING, AND DIARRHEA: Primary | ICD-10-CM

## 2018-11-26 DIAGNOSIS — R19.7 NAUSEA, VOMITING, AND DIARRHEA: Primary | ICD-10-CM

## 2018-11-26 LAB
ALBUMIN UR-MCNC: NEGATIVE MG/DL
ANION GAP SERPL CALCULATED.3IONS-SCNC: 6 MMOL/L (ref 3–14)
APPEARANCE UR: CLEAR
BASOPHILS # BLD AUTO: 0.1 10E9/L (ref 0–0.2)
BASOPHILS NFR BLD AUTO: 1.3 %
BILIRUB UR QL STRIP: NEGATIVE
BUN SERPL-MCNC: 18 MG/DL (ref 7–30)
CALCIUM SERPL-MCNC: 7.9 MG/DL (ref 8.5–10.1)
CHLORIDE SERPL-SCNC: 105 MMOL/L (ref 94–109)
CO2 SERPL-SCNC: 26 MMOL/L (ref 20–32)
COLOR UR AUTO: YELLOW
CREAT SERPL-MCNC: 0.84 MG/DL (ref 0.66–1.25)
DIFFERENTIAL METHOD BLD: ABNORMAL
EOSINOPHIL # BLD AUTO: 0.2 10E9/L (ref 0–0.7)
EOSINOPHIL NFR BLD AUTO: 2.9 %
ERYTHROCYTE [DISTWIDTH] IN BLOOD BY AUTOMATED COUNT: 13.2 % (ref 10–15)
GFR SERPL CREATININE-BSD FRML MDRD: >90 ML/MIN/1.7M2
GLUCOSE SERPL-MCNC: 94 MG/DL (ref 70–99)
GLUCOSE UR STRIP-MCNC: NEGATIVE MG/DL
HCT VFR BLD AUTO: 49.4 % (ref 40–53)
HGB BLD-MCNC: 15.4 G/DL (ref 13.3–17.7)
HGB UR QL STRIP: NEGATIVE
IMM GRANULOCYTES # BLD: 0.2 10E9/L (ref 0–0.4)
IMM GRANULOCYTES NFR BLD: 1.9 %
KETONES UR STRIP-MCNC: 5 MG/DL
LEUKOCYTE ESTERASE UR QL STRIP: NEGATIVE
LIPASE SERPL-CCNC: 148 U/L (ref 73–393)
LYMPHOCYTES # BLD AUTO: 1.6 10E9/L (ref 0.8–5.3)
LYMPHOCYTES NFR BLD AUTO: 19.6 %
MCH RBC QN AUTO: 27.7 PG (ref 26.5–33)
MCHC RBC AUTO-ENTMCNC: 31.2 G/DL (ref 31.5–36.5)
MCV RBC AUTO: 89 FL (ref 78–100)
MONOCYTES # BLD AUTO: 0.9 10E9/L (ref 0–1.3)
MONOCYTES NFR BLD AUTO: 11.4 %
NEUTROPHILS # BLD AUTO: 5 10E9/L (ref 1.6–8.3)
NEUTROPHILS NFR BLD AUTO: 62.9 %
NITRATE UR QL: NEGATIVE
NRBC # BLD AUTO: 0 10*3/UL
NRBC BLD AUTO-RTO: 0 /100
PH UR STRIP: 5 PH (ref 5–7)
PLATELET # BLD AUTO: 244 10E9/L (ref 150–450)
POTASSIUM SERPL-SCNC: 4 MMOL/L (ref 3.4–5.3)
RBC # BLD AUTO: 5.55 10E12/L (ref 4.4–5.9)
SODIUM SERPL-SCNC: 137 MMOL/L (ref 133–144)
SOURCE: ABNORMAL
SP GR UR STRIP: 1.03 (ref 1–1.03)
UROBILINOGEN UR STRIP-MCNC: 0 MG/DL (ref 0–2)
WBC # BLD AUTO: 8 10E9/L (ref 4–11)

## 2018-11-26 PROCEDURE — 96361 HYDRATE IV INFUSION ADD-ON: CPT | Performed by: PHYSICIAN ASSISTANT

## 2018-11-26 PROCEDURE — 74177 CT ABD & PELVIS W/CONTRAST: CPT

## 2018-11-26 PROCEDURE — 25000128 H RX IP 250 OP 636: Performed by: PHYSICIAN ASSISTANT

## 2018-11-26 PROCEDURE — 96374 THER/PROPH/DIAG INJ IV PUSH: CPT | Mod: 59 | Performed by: PHYSICIAN ASSISTANT

## 2018-11-26 PROCEDURE — 96375 TX/PRO/DX INJ NEW DRUG ADDON: CPT | Performed by: PHYSICIAN ASSISTANT

## 2018-11-26 PROCEDURE — 85025 COMPLETE CBC W/AUTO DIFF WBC: CPT | Performed by: PHYSICIAN ASSISTANT

## 2018-11-26 PROCEDURE — 25000125 ZZHC RX 250: Performed by: PHYSICIAN ASSISTANT

## 2018-11-26 PROCEDURE — 99285 EMERGENCY DEPT VISIT HI MDM: CPT | Mod: 25 | Performed by: PHYSICIAN ASSISTANT

## 2018-11-26 PROCEDURE — 83690 ASSAY OF LIPASE: CPT | Performed by: PHYSICIAN ASSISTANT

## 2018-11-26 PROCEDURE — 80048 BASIC METABOLIC PNL TOTAL CA: CPT | Performed by: PHYSICIAN ASSISTANT

## 2018-11-26 PROCEDURE — 99284 EMERGENCY DEPT VISIT MOD MDM: CPT | Mod: Z6 | Performed by: PHYSICIAN ASSISTANT

## 2018-11-26 PROCEDURE — 81003 URINALYSIS AUTO W/O SCOPE: CPT | Performed by: PHYSICIAN ASSISTANT

## 2018-11-26 RX ORDER — ONDANSETRON 4 MG/1
4 TABLET, ORALLY DISINTEGRATING ORAL EVERY 8 HOURS PRN
Qty: 10 TABLET | Refills: 0 | Status: SHIPPED | OUTPATIENT
Start: 2018-11-26 | End: 2018-11-29

## 2018-11-26 RX ORDER — ONDANSETRON 2 MG/ML
4 INJECTION INTRAMUSCULAR; INTRAVENOUS EVERY 30 MIN PRN
Status: DISCONTINUED | OUTPATIENT
Start: 2018-11-26 | End: 2018-11-26 | Stop reason: HOSPADM

## 2018-11-26 RX ORDER — KETOROLAC TROMETHAMINE 30 MG/ML
30 INJECTION, SOLUTION INTRAMUSCULAR; INTRAVENOUS ONCE
Status: COMPLETED | OUTPATIENT
Start: 2018-11-26 | End: 2018-11-26

## 2018-11-26 RX ORDER — IOPAMIDOL 755 MG/ML
100 INJECTION, SOLUTION INTRAVASCULAR ONCE
Status: COMPLETED | OUTPATIENT
Start: 2018-11-26 | End: 2018-11-26

## 2018-11-26 RX ADMIN — KETOROLAC TROMETHAMINE 30 MG: 30 INJECTION, SOLUTION INTRAMUSCULAR at 11:48

## 2018-11-26 RX ADMIN — SODIUM CHLORIDE 1000 ML: 9 INJECTION, SOLUTION INTRAVENOUS at 11:48

## 2018-11-26 RX ADMIN — SODIUM CHLORIDE 67 ML: 9 INJECTION, SOLUTION INTRAVENOUS at 13:55

## 2018-11-26 RX ADMIN — IOPAMIDOL 100 ML: 755 INJECTION, SOLUTION INTRAVENOUS at 13:55

## 2018-11-26 RX ADMIN — ONDANSETRON 4 MG: 2 INJECTION INTRAMUSCULAR; INTRAVENOUS at 11:06

## 2018-11-26 RX ADMIN — SODIUM CHLORIDE 1000 ML: 9 INJECTION, SOLUTION INTRAVENOUS at 11:06

## 2018-11-26 NOTE — ED PROVIDER NOTES
"  History     Chief Complaint   Patient presents with     Abdominal Pain     since Friday; diarrhea (watery)     HPI  Gianluca Ortiz is a 28 year old male who presents with complaints of nausea, vomiting, and diarrhea for the past 2-3 days.  Patient reports numerous episodes of nonbloody, watery diarrhea and nonbloody emesis over this time.  He states he is unable to keep down any fluid or food.  Patient complains of associated diffuse lower abdominal pain, cramping, and distention.  He states his wife is ill with similar symptoms and was in the emergency department over the weekend but her symptoms have since resolved.  Patient's symptoms have persisted.  He denies any recent travel or antibiotic use.  Denies fevers, chills, or urinary symptoms.      Problem List:    There are no active problems to display for this patient.       Past Medical History:    No past medical history on file.    Past Surgical History:    No past surgical history on file.    Family History:    No family history on file.    Social History:  Marital Status:  Single [1]  Social History   Substance Use Topics     Smoking status: Former Smoker     Packs/day: 0.50     Years: 5.00     Types: Cigarettes     Smokeless tobacco: Current User     Alcohol use Yes        Medications:      ondansetron (ZOFRAN ODT) 4 MG ODT tab   albuterol (PROAIR HFA/PROVENTIL HFA/VENTOLIN HFA) 108 (90 Base) MCG/ACT inhaler   diphenhydrAMINE (BENADRYL) 25 MG tablet   predniSONE (DELTASONE) 20 MG tablet         Review of Systems   Constitutional: Negative.    Respiratory: Negative.    Cardiovascular: Negative.    Gastrointestinal: Positive for abdominal pain (lower), diarrhea, nausea and vomiting.   Genitourinary: Negative.    Musculoskeletal: Negative.    Skin: Negative.    Neurological: Negative.    All other systems reviewed and are negative.      Physical Exam   BP: 143/81  Heart Rate: 75  Temp: 98.2  F (36.8  C)  Resp: 16  Height: 170.2 cm (5' 7\")  Weight: 99.8 " kg (220 lb)  SpO2: 96 %      Physical Exam   Constitutional: He appears well-developed and well-nourished.  Non-toxic appearance. No distress.   HENT:   Head: Normocephalic and atraumatic.   Mouth/Throat: Oropharynx is clear and moist. Mucous membranes are dry.   Eyes: Conjunctivae and EOM are normal. Pupils are equal, round, and reactive to light.   Neck: Normal range of motion. Neck supple.   Cardiovascular: Normal rate, regular rhythm and normal heart sounds.    Pulmonary/Chest: Effort normal and breath sounds normal. No respiratory distress. He has no decreased breath sounds. He has no wheezes. He has no rhonchi. He has no rales.   Abdominal: Soft. He exhibits no distension. There is tenderness (diffusely across lower abdomen) in the right lower quadrant, suprapubic area and left lower quadrant. There is guarding. There is no rigidity, no rebound and no CVA tenderness.   Musculoskeletal: Normal range of motion.   Neurological: He is alert. He has normal strength. No cranial nerve deficit or sensory deficit. Gait normal.   Skin: Skin is warm and dry. No rash noted.   Psychiatric: He has a normal mood and affect.       ED Course     ED Course     Procedures    She.  Results for orders placed or performed during the hospital encounter of 11/26/18 (from the past 24 hour(s))   CBC with platelets differential   Result Value Ref Range    WBC 8.0 4.0 - 11.0 10e9/L    RBC Count 5.55 4.4 - 5.9 10e12/L    Hemoglobin 15.4 13.3 - 17.7 g/dL    Hematocrit 49.4 40.0 - 53.0 %    MCV 89 78 - 100 fl    MCH 27.7 26.5 - 33.0 pg    MCHC 31.2 (L) 31.5 - 36.5 g/dL    RDW 13.2 10.0 - 15.0 %    Platelet Count 244 150 - 450 10e9/L    Diff Method Automated Method     % Neutrophils 62.9 %    % Lymphocytes 19.6 %    % Monocytes 11.4 %    % Eosinophils 2.9 %    % Basophils 1.3 %    % Immature Granulocytes 1.9 %    Nucleated RBCs 0 0 /100    Absolute Neutrophil 5.0 1.6 - 8.3 10e9/L    Absolute Lymphocytes 1.6 0.8 - 5.3 10e9/L    Absolute  Monocytes 0.9 0.0 - 1.3 10e9/L    Absolute Eosinophils 0.2 0.0 - 0.7 10e9/L    Absolute Basophils 0.1 0.0 - 0.2 10e9/L    Abs Immature Granulocytes 0.2 0 - 0.4 10e9/L    Absolute Nucleated RBC 0.0    Basic metabolic panel   Result Value Ref Range    Sodium 137 133 - 144 mmol/L    Potassium 4.0 3.4 - 5.3 mmol/L    Chloride 105 94 - 109 mmol/L    Carbon Dioxide 26 20 - 32 mmol/L    Anion Gap 6 3 - 14 mmol/L    Glucose 94 70 - 99 mg/dL    Urea Nitrogen 18 7 - 30 mg/dL    Creatinine 0.84 0.66 - 1.25 mg/dL    GFR Estimate >90 >60 mL/min/1.7m2    GFR Estimate If Black >90 >60 mL/min/1.7m2    Calcium 7.9 (L) 8.5 - 10.1 mg/dL   Lipase   Result Value Ref Range    Lipase 148 73 - 393 U/L   UA reflex to Microscopic   Result Value Ref Range    Color Urine Yellow     Appearance Urine Clear     Glucose Urine Negative NEG^Negative mg/dL    Bilirubin Urine Negative NEG^Negative    Ketones Urine 5 (A) NEG^Negative mg/dL    Specific Gravity Urine 1.026 1.003 - 1.035    Blood Urine Negative NEG^Negative    pH Urine 5.0 5.0 - 7.0 pH    Protein Albumin Urine Negative NEG^Negative mg/dL    Urobilinogen mg/dL 0.0 0.0 - 2.0 mg/dL    Nitrite Urine Negative NEG^Negative    Leukocyte Esterase Urine Negative NEG^Negative    Source Midstream Urine    CT Abdomen Pelvis w Contrast    Narrative    CT ABDOMEN/PELVIS WITH CONTRAST November 26, 2018 2:03 PM     HISTORY: Diffuse lower abdominal tenderness, +diarrhea.     TECHNIQUE: CT abdomen and pelvis with 100mL Isovue-370 IV. Radiation  dose for this scan was reduced using automated exposure control,  adjustment of the mA and/or kV according to patient size, or iterative  reconstruction technique.    COMPARISON: None.    FINDINGS:  Abdomen: There is minimal dependent atelectasis at the lung bases. The  heart size is normal. The liver, spleen, gallbladder, pancreas,  adrenal glands and kidneys are normal in appearance. There is no  abdominal or pelvic lymph node enlargement.    Pelvis: There is no  bowel obstruction or inflammation. The appendix is  not seen and may be absent. No free intraperitoneal gas or fluid.      Impression    IMPRESSION: No acute abnormality. No bowel obstruction or  inflammation.    ABIEL AYALA MD       Medications   0.9% sodium chloride BOLUS (0 mLs Intravenous Stopped 11/26/18 1147)   0.9% sodium chloride BOLUS (0 mLs Intravenous Stopped 11/26/18 1309)   ketorolac (TORADOL) injection 30 mg (30 mg Intravenous Given 11/26/18 1148)   iopamidol (ISOVUE-370) solution 100 mL (100 mLs Intravenous Given 11/26/18 1355)   sodium chloride 0.9 % bag 500mL for CT scan flush use (67 mLs As instructed Given 11/26/18 1355)       Assessments & Plan (with Medical Decision Making)     Pt is a 28 year old male who presents with complaints of nausea, vomiting, and diarrhea for the past 2-3 days.  Patient reports numerous episodes of nonbloody, watery diarrhea and nonbloody emesis over this time.  He states he is unable to keep down any fluid or food.  Patient complains of associated diffuse lower abdominal pain, cramping, and distention.  He states his wife is ill with similar symptoms and was in the emergency department over the weekend but her symptoms have since resolved.  Patient's symptoms have persisted.  He denies any recent travel or antibiotic use.  Denies fevers, chills, or urinary symptoms.  Pt is afebrile on arrival.  Exam as above.  No leukocytosis or signs of anemia on CBC.  Basic metabolic panel reveals normal electrolytes and kidney function.  Lipase is not elevated.  Urinalysis was negative for infection or hematuria.  Patient was given Toradol, Zofran, and IV fluids.  He continues to have diffuse lower abdominal tenderness and pain and therefore a CT of the abdomen and pelvis was obtained which was negative for acute pathology.  No evidence of diverticulitis, bowel obstruction, inflammation, etc.  He states he is now feeling improved and hungry.  He is able to tolerate crackers  without difficulties.  Encouraged continued symptomatic treatment and hydration at home for suspected viral gastroenteritis.  Discussed results with patient.  Return precautions were reviewed.  Hand-outs were provided.    Patient was sent with Zofran and was instructed to follow-up with PCP if no improvement in 3-5 days for continued care and management or sooner if new or worsening symptoms.  He is to return to the ED for persistent and/or worsening symptoms.  Patient expressed understanding of the diagnosis and plan and was discharged home in good condition.    I have reviewed the nursing notes.    I have reviewed the findings, diagnosis, plan and need for follow up with the patient.    Discharge Medication List as of 11/26/2018  2:41 PM      START taking these medications    Details   ondansetron (ZOFRAN ODT) 4 MG ODT tab Take 1 tablet (4 mg) by mouth every 8 hours as needed for nausea, Disp-10 tablet, R-0, E-Prescribe             Final diagnoses:   Nausea, vomiting, and diarrhea       11/26/2018   [facility] EMERGENCY DEPARTMENT    Disclaimer:  This note consists of symbols derived from keyboarding, dictation and/or voice recognition software.  As a result, there may be errors in the script that have gone undetected.  Please consider this when interpreting information found in this chart.       Neela Lopes PA-C  11/27/18 7082

## 2018-11-26 NOTE — ED NOTES
abd cramping, nausea, vomiting and diarrhea since Friday.  Wife recently seen in ED for same symptoms.  No urinary symptoms.   No blood in stool

## 2018-11-26 NOTE — ED AVS SNAPSHOT
[facility]    [address] [address]    Phone:  [phone]    Fax:  [phone]                                       Gianluca Ortiz   MRN: [id number]    Department:  [facility]   Date of Visit:  11/26/2018           Patient Information     Date Of Birth          1990        Your diagnoses for this visit were:     Nausea, vomiting, and diarrhea        You were seen by Neela Lopes PA-C.      Follow-up Information     Follow up with Miroslava Restrepo APRN CNP. Call in 5 days.    Specialty:  Nurse Practitioner    Why:  As needed, For persistent symptoms    Contact information:    [address] [place] [address] [id number] [phone]          Follow up with [facility].    Specialty:  EMERGENCY MEDICINE    Why:  As needed, If symptoms worsen    Contact information:    [address] [place] [id number] [phone]    Additional information:    The medical center is located at   [address] [place] [address] (between [facility] and   [facility] in [place], four miles north   of [place]).      Discharge References/Attachments     PHARYNGITIS, VIRAL (ENGLISH)      24 Hour Appointment Hotline       To make an appointment at any [facility] clinic, call [phone] ([phone]). If you don't have a family doctor or clinic, we will help you find one. [facility] clinics are conveniently located to serve the needs of you and your family.             Review of your medicines      START taking        Dose / Directions Last dose taken    ondansetron 4 MG ODT tab   Commonly known as:  ZOFRAN ODT   Dose:  4 mg   Quantity:  10 tablet        Take 1 tablet (4 mg) by mouth every 8 hours as needed for nausea   Refills:  0          Our records show that you are taking the medicines listed below. If these are incorrect, please call your family doctor or clinic.        Dose / Directions Last dose taken    albuterol 108 (90  Base) MCG/ACT inhaler   Commonly known as:  PROAIR HFA/PROVENTIL HFA/VENTOLIN HFA   Dose:  2 puff   Quantity:  1 Inhaler        Inhale 2 puffs into the lungs every 6 hours as needed for shortness of breath / dyspnea or wheezing   Refills:  0        diphenhydrAMINE 25 MG tablet   Commonly known as:  BENADRYL   Dose:  25 mg   Quantity:  30 tablet        Take 1 tablet by mouth every 6 hours as needed for itching.   Refills:  0        predniSONE 20 MG tablet   Commonly known as:  DELTASONE   Dose:  60 mg   Quantity:  15 tablet        Take 3 tablets (60 mg) by mouth daily   Refills:  0                Prescriptions were sent or printed at these locations (1 Prescription)                   [facility] [address] [place] [address] [address] [place] [address]   [address] [place] [address]    Telephone:  [phone]   Fax:  [phone]   Hours:                  E-Prescribed (1 of 1)         ondansetron (ZOFRAN ODT) 4 MG ODT tab                Procedures and tests performed during your visit     Basic metabolic panel    CBC with platelets differential    CT Abdomen Pelvis w Contrast    Give 20 ounces of water 15 minutes before CT of abdomen    Lipase    Peripheral IV catheter    UA reflex to Microscopic      Orders Needing Specimen Collection     None      Pending Results     No orders found from 11/24/2018 to 11/27/2018.            Pending Culture Results     No orders found from 11/24/2018 to 11/27/2018.            Pending Results Instructions     If you had any lab results that were not finalized at the time of your Discharge, you can call the ED Lab Result RN at [phone]. You will be contacted by this team for any positive Lab results or changes in treatment. The nurses are available 7 days a week from 10A to 6:30P.  You can leave a message 24 hours per day and they will return your call.        Test Results From Your Hospital Stay        11/26/2018 11:10 AM      Component Results     Component Value Ref Range &  Units Status    WBC 8.0 4.0 - 11.0 10e9/L Final    RBC Count 5.55 4.4 - 5.9 10e12/L Final    Hemoglobin 15.4 13.3 - 17.7 g/dL Final    Hematocrit 49.4 40.0 - 53.0 % Final    MCV 89 78 - 100 fl Final    MCH 27.7 26.5 - 33.0 pg Final    MCHC 31.2 (L) 31.5 - 36.5 g/dL Final    RDW 13.2 10.0 - 15.0 % Final    Platelet Count 244 150 - 450 10e9/L Final    Diff Method Automated Method  Final    % Neutrophils 62.9 % Final    % Lymphocytes 19.6 % Final    % Monocytes 11.4 % Final    % Eosinophils 2.9 % Final    % Basophils 1.3 % Final    % Immature Granulocytes 1.9 % Final    Nucleated RBCs 0 0 /100 Final    Absolute Neutrophil 5.0 1.6 - 8.3 10e9/L Final    Absolute Lymphocytes 1.6 0.8 - 5.3 10e9/L Final    Absolute Monocytes 0.9 0.0 - 1.3 10e9/L Final    Absolute Eosinophils 0.2 0.0 - 0.7 10e9/L Final    Absolute Basophils 0.1 0.0 - 0.2 10e9/L Final    Abs Immature Granulocytes 0.2 0 - 0.4 10e9/L Final    Absolute Nucleated RBC 0.0  Final         11/26/2018 11:25 AM      Component Results     Component Value Ref Range & Units Status    Sodium 137 133 - 144 mmol/L Final    Potassium 4.0 3.4 - 5.3 mmol/L Final    Chloride 105 94 - 109 mmol/L Final    Carbon Dioxide 26 20 - 32 mmol/L Final    Anion Gap 6 3 - 14 mmol/L Final    Glucose 94 70 - 99 mg/dL Final    Urea Nitrogen 18 7 - 30 mg/dL Final    Creatinine 0.84 0.66 - 1.25 mg/dL Final    GFR Estimate >90 >60 mL/min/1.7m2 Final    Non  GFR Calc    GFR Estimate If Black >90 >60 mL/min/1.7m2 Final    African American GFR Calc    Calcium 7.9 (L) 8.5 - 10.1 mg/dL Final         11/26/2018  1:31 PM      Component Results     Component Value Ref Range & Units Status    Color Urine Yellow  Final    Appearance Urine Clear  Final    Glucose Urine Negative NEG^Negative mg/dL Final    Bilirubin Urine Negative NEG^Negative Final    Ketones Urine 5 (A) NEG^Negative mg/dL Final    Specific Gravity Urine 1.026 1.003 - 1.035 Final    Blood Urine Negative NEG^Negative Final     "pH Urine 5.0 5.0 - 7.0 pH Final    Protein Albumin Urine Negative NEG^Negative mg/dL Final    Urobilinogen mg/dL 0.0 0.0 - 2.0 mg/dL Final    Nitrite Urine Negative NEG^Negative Final    Leukocyte Esterase Urine Negative NEG^Negative Final    Source Midstream Urine  Final         11/26/2018 11:54 AM      Component Results     Component Value Ref Range & Units Status    Lipase 148 73 - 393 U/L Final         11/26/2018  2:17 PM      Narrative     CT ABDOMEN/PELVIS WITH CONTRAST November 26, 2018 2:03 PM     HISTORY: Diffuse lower abdominal tenderness, +diarrhea.     TECHNIQUE: CT abdomen and pelvis with 100mL Isovue-370 IV. Radiation  dose for this scan was reduced using automated exposure control,  adjustment of the mA and/or kV according to patient size, or iterative  reconstruction technique.    COMPARISON: None.    FINDINGS:  Abdomen: There is minimal dependent atelectasis at the lung bases. The  heart size is normal. The liver, spleen, gallbladder, pancreas,  adrenal glands and kidneys are normal in appearance. There is no  abdominal or pelvic lymph node enlargement.    Pelvis: There is no bowel obstruction or inflammation. The appendix is  not seen and may be absent. No free intraperitoneal gas or fluid.        Impression     IMPRESSION: No acute abnormality. No bowel obstruction or  inflammation.    ABIEL AYALA MD                Thank you for choosing [facility]       Thank you for choosing [facility] for your care. Our goal is always to provide you with excellent care. Hearing back from our patients is one way we can continue to improve our services. Please take a few minutes to complete the written survey that you may receive in the mail after you visit with us. Thank you!        Digital Message [employer] Information     [facility]Once lets you send messages to your doctor, view your test results, renew your prescriptions, schedule appointments and more. To sign up, go to [email or web address] . Click on \"Log in\" on the left side " "of the screen, which will take you to the Welcome page. Then click on \"Sign up Now\" on the right side of the page.     You will be asked to enter the access code listed below, as well as some personal information. Please follow the directions to create your username and password.     Your access code is: [email or web address]  Expires: 2019  5:37 PM     Your access code will  in 90 days. If you need help or a new code, please call your [facility] clinic or [phone].        Care EveryWhere ID     This is your Care EveryWhere ID. This could be used by other organizations to access your [facility] medical records  [id number]        Equal Access to Services     DONI KOO : Charlotte Patel, mariangel jones, marsha barreto, memo garcia. So [facility] [phone].    ATENCIÓN: Si habla español, tiene a george disposición servicios gratuitos de asistencia lingüística. Llame al [phone].    We comply with applicable federal civil rights laws and [place] laws. We do not discriminate on the basis of race, color, national origin, age, disability, sex, sexual orientation, or gender identity.            After Visit Summary       This is your record. Keep this with you and show to your community pharmacist(s) and doctor(s) at your next visit.                  "

## 2018-11-26 NOTE — ED AVS SNAPSHOT
[facility] Emergency Department    [address] [address]    Phone:  [phone]    Fax:  [phone]                                       Gianluca Ortiz   MRN: [id number]    Department:  [facility] Department   Date of Visit:  11/26/2018           After Visit Summary Signature Page     I have received my discharge instructions, and my questions have been answered. I have discussed any challenges I see with this plan with the nurse or doctor.    ..........................................................................................................................................  Patient/Patient Representative Signature      ..........................................................................................................................................  Patient Representative Print Name and Relationship to Patient    ..................................................               ................................................  Date                                   Time    ..........................................................................................................................................  Reviewed by Signature/Title    ...................................................              ..............................................  Date                                               Time          22EPIC Rev 08/18

## 2018-11-27 ASSESSMENT — ENCOUNTER SYMPTOMS
NAUSEA: 1
CARDIOVASCULAR NEGATIVE: 1
MUSCULOSKELETAL NEGATIVE: 1
VOMITING: 1
NEUROLOGICAL NEGATIVE: 1
ABDOMINAL PAIN: 1
CONSTITUTIONAL NEGATIVE: 1
DIARRHEA: 1
RESPIRATORY NEGATIVE: 1

## 2018-12-26 ENCOUNTER — HOSPITAL ENCOUNTER (EMERGENCY)
Facility: CLINIC | Age: 28
Discharge: HOME | End: 2018-12-26
Attending: EMERGENCY MEDICINE | Admitting: EMERGENCY MEDICINE
Payer: COMMERCIAL

## 2018-12-26 ENCOUNTER — APPOINTMENT (OUTPATIENT)
Dept: GENERAL RADIOLOGY | Facility: CLINIC | Age: 28
End: 2018-12-26
Attending: EMERGENCY MEDICINE
Payer: COMMERCIAL

## 2018-12-26 VITALS
BODY MASS INDEX: 34.46 KG/M2 | HEART RATE: 92 BPM | DIASTOLIC BLOOD PRESSURE: 75 MMHG | TEMPERATURE: 99.8 F | HEIGHT: 67 IN | OXYGEN SATURATION: 97 % | RESPIRATION RATE: 10 BRPM | SYSTOLIC BLOOD PRESSURE: 119 MMHG

## 2018-12-26 DIAGNOSIS — J11.1 INFLUENZA-LIKE ILLNESS: ICD-10-CM

## 2018-12-26 LAB
ALBUMIN SERPL-MCNC: 4 G/DL (ref 3.4–5)
ALP SERPL-CCNC: 88 U/L (ref 40–150)
ALT SERPL W P-5'-P-CCNC: 47 U/L (ref 0–70)
ANION GAP SERPL CALCULATED.3IONS-SCNC: 8 MMOL/L (ref 3–14)
AST SERPL W P-5'-P-CCNC: 27 U/L (ref 0–45)
BASOPHILS # BLD AUTO: 0.1 10E9/L (ref 0–0.2)
BASOPHILS NFR BLD AUTO: 0.6 %
BILIRUB SERPL-MCNC: 0.2 MG/DL (ref 0.2–1.3)
BUN SERPL-MCNC: 15 MG/DL (ref 7–30)
CALCIUM SERPL-MCNC: 9.1 MG/DL (ref 8.5–10.1)
CHLORIDE SERPL-SCNC: 102 MMOL/L (ref 94–109)
CO2 SERPL-SCNC: 28 MMOL/L (ref 20–32)
CREAT SERPL-MCNC: 0.96 MG/DL (ref 0.66–1.25)
DEPRECATED S PYO AG THROAT QL EIA: NORMAL
DIFFERENTIAL METHOD BLD: ABNORMAL
EOSINOPHIL # BLD AUTO: 0.2 10E9/L (ref 0–0.7)
EOSINOPHIL NFR BLD AUTO: 1.2 %
ERYTHROCYTE [DISTWIDTH] IN BLOOD BY AUTOMATED COUNT: 12.7 % (ref 10–15)
FLUAV+FLUBV AG SPEC QL: NEGATIVE
FLUAV+FLUBV AG SPEC QL: NEGATIVE
GFR SERPL CREATININE-BSD FRML MDRD: >90 ML/MIN/{1.73_M2}
GLUCOSE SERPL-MCNC: 82 MG/DL (ref 70–99)
HCT VFR BLD AUTO: 45.9 % (ref 40–53)
HGB BLD-MCNC: 14.4 G/DL (ref 13.3–17.7)
IMM GRANULOCYTES # BLD: 0.1 10E9/L (ref 0–0.4)
IMM GRANULOCYTES NFR BLD: 0.5 %
LACTATE BLD-SCNC: 2.1 MMOL/L (ref 0.7–2)
LYMPHOCYTES # BLD AUTO: 1.4 10E9/L (ref 0.8–5.3)
LYMPHOCYTES NFR BLD AUTO: 8.7 %
MCH RBC QN AUTO: 27.7 PG (ref 26.5–33)
MCHC RBC AUTO-ENTMCNC: 31.4 G/DL (ref 31.5–36.5)
MCV RBC AUTO: 88 FL (ref 78–100)
MONOCYTES # BLD AUTO: 0.9 10E9/L (ref 0–1.3)
MONOCYTES NFR BLD AUTO: 6 %
NEUTROPHILS # BLD AUTO: 12.8 10E9/L (ref 1.6–8.3)
NEUTROPHILS NFR BLD AUTO: 83 %
NRBC # BLD AUTO: 0 10*3/UL
NRBC BLD AUTO-RTO: 0 /100
PLATELET # BLD AUTO: 246 10E9/L (ref 150–450)
POTASSIUM SERPL-SCNC: 3.8 MMOL/L (ref 3.4–5.3)
PROT SERPL-MCNC: 7.9 G/DL (ref 6.8–8.8)
RBC # BLD AUTO: 5.2 10E12/L (ref 4.4–5.9)
SODIUM SERPL-SCNC: 138 MMOL/L (ref 133–144)
SPECIMEN SOURCE: NORMAL
SPECIMEN SOURCE: NORMAL
WBC # BLD AUTO: 15.5 10E9/L (ref 4–11)

## 2018-12-26 PROCEDURE — 25000128 H RX IP 250 OP 636: Performed by: EMERGENCY MEDICINE

## 2018-12-26 PROCEDURE — 80053 COMPREHEN METABOLIC PANEL: CPT | Performed by: EMERGENCY MEDICINE

## 2018-12-26 PROCEDURE — 99284 EMERGENCY DEPT VISIT MOD MDM: CPT | Mod: 25 | Performed by: EMERGENCY MEDICINE

## 2018-12-26 PROCEDURE — 25000132 ZZH RX MED GY IP 250 OP 250 PS 637: Performed by: EMERGENCY MEDICINE

## 2018-12-26 PROCEDURE — 83605 ASSAY OF LACTIC ACID: CPT | Performed by: EMERGENCY MEDICINE

## 2018-12-26 PROCEDURE — 94640 AIRWAY INHALATION TREATMENT: CPT

## 2018-12-26 PROCEDURE — 99284 EMERGENCY DEPT VISIT MOD MDM: CPT | Mod: Z6 | Performed by: EMERGENCY MEDICINE

## 2018-12-26 PROCEDURE — 87081 CULTURE SCREEN ONLY: CPT | Performed by: EMERGENCY MEDICINE

## 2018-12-26 PROCEDURE — 87077 CULTURE AEROBIC IDENTIFY: CPT | Performed by: EMERGENCY MEDICINE

## 2018-12-26 PROCEDURE — 87880 STREP A ASSAY W/OPTIC: CPT | Performed by: EMERGENCY MEDICINE

## 2018-12-26 PROCEDURE — 25000125 ZZHC RX 250: Performed by: EMERGENCY MEDICINE

## 2018-12-26 PROCEDURE — 96361 HYDRATE IV INFUSION ADD-ON: CPT | Performed by: EMERGENCY MEDICINE

## 2018-12-26 PROCEDURE — 85025 COMPLETE CBC W/AUTO DIFF WBC: CPT | Performed by: EMERGENCY MEDICINE

## 2018-12-26 PROCEDURE — 96360 HYDRATION IV INFUSION INIT: CPT | Performed by: EMERGENCY MEDICINE

## 2018-12-26 PROCEDURE — 87804 INFLUENZA ASSAY W/OPTIC: CPT | Mod: 91 | Performed by: EMERGENCY MEDICINE

## 2018-12-26 PROCEDURE — 71046 X-RAY EXAM CHEST 2 VIEWS: CPT

## 2018-12-26 RX ORDER — MONTELUKAST SODIUM 10 MG/1
10 TABLET ORAL AT BEDTIME
COMMUNITY

## 2018-12-26 RX ORDER — IBUPROFEN 400 MG/1
800 TABLET, FILM COATED ORAL ONCE
Status: COMPLETED | OUTPATIENT
Start: 2018-12-26 | End: 2018-12-26

## 2018-12-26 RX ORDER — ACETAMINOPHEN 500 MG
1000 TABLET ORAL ONCE
Status: COMPLETED | OUTPATIENT
Start: 2018-12-26 | End: 2018-12-26

## 2018-12-26 RX ORDER — FLUTICASONE PROPIONATE 110 UG/1
1 AEROSOL, METERED RESPIRATORY (INHALATION) 2 TIMES DAILY
COMMUNITY

## 2018-12-26 RX ORDER — LIDOCAINE 40 MG/G
CREAM TOPICAL
Status: DISCONTINUED | OUTPATIENT
Start: 2018-12-26 | End: 2018-12-27 | Stop reason: HOSPADM

## 2018-12-26 RX ORDER — IPRATROPIUM BROMIDE AND ALBUTEROL SULFATE 2.5; .5 MG/3ML; MG/3ML
3 SOLUTION RESPIRATORY (INHALATION) ONCE
Status: COMPLETED | OUTPATIENT
Start: 2018-12-26 | End: 2018-12-26

## 2018-12-26 RX ADMIN — IBUPROFEN 800 MG: 400 TABLET ORAL at 18:26

## 2018-12-26 RX ADMIN — ACETAMINOPHEN 1000 MG: 500 TABLET ORAL at 18:26

## 2018-12-26 RX ADMIN — SODIUM CHLORIDE 1000 ML: 9 INJECTION, SOLUTION INTRAVENOUS at 18:29

## 2018-12-26 RX ADMIN — IPRATROPIUM BROMIDE AND ALBUTEROL SULFATE 3 ML: .5; 3 SOLUTION RESPIRATORY (INHALATION) at 18:40

## 2018-12-26 RX ADMIN — SODIUM CHLORIDE 1000 ML: 9 INJECTION, SOLUTION INTRAVENOUS at 19:18

## 2018-12-26 NOTE — ED AVS SNAPSHOT
[facility]  [address] [facility] [address]  Phone:  [phone]  Fax:  [phone]                                    Gianluca Ortiz   MRN: [id number]    Department:  [facility]   Date of Visit:  12/26/2018           After Visit Summary Signature Page    I have received my discharge instructions, and my questions have been answered. I have discussed any challenges I see with this plan with the nurse or doctor.    ..........................................................................................................................................  Patient/Patient Representative Signature      ..........................................................................................................................................  Patient Representative Print Name and Relationship to Patient    ..................................................               ................................................  Date                                   Time    ..........................................................................................................................................  Reviewed by Signature/Title    ...................................................              ..............................................  Date                                               Time          22EPIC Rev 08/18

## 2018-12-27 NOTE — ED PROVIDER NOTES
"  History     Chief Complaint   Patient presents with     Flu Symptoms     Back Pain     HPI  Gianluca Ortiz is a 28 year old male who presents with sudden onset shaking chills, headache, mild cough, nausea, vomiting several times spouse and son here with similar symptoms.  Reports getting flu vaccination this year.  He is a smoker, reports history of asthma.  Denies diarrhea.  Reports history of prior appendectomy.    Problem List:    There are no active problems to display for this patient.       Past Medical History:    No past medical history on file.    Past Surgical History:    No past surgical history on file.    Family History:    No family history on file.    Social History:  Marital Status:  Single [1]  Social History     Tobacco Use     Smoking status: Former Smoker     Packs/day: 0.50     Years: 5.00     Pack years: 2.50     Types: Cigarettes     Smokeless tobacco: Current User   Substance Use Topics     Alcohol use: Yes     Drug use: No        Medications:      albuterol (PROAIR HFA/PROVENTIL HFA/VENTOLIN HFA) 108 (90 Base) MCG/ACT inhaler   diphenhydrAMINE (BENADRYL) 25 MG tablet   fluticasone (FLOVENT HFA) 110 MCG/ACT inhaler   montelukast (SINGULAIR) 10 MG tablet         Review of Systems  All other systems reviewed and are negative.    Physical Exam   BP: (!) 154/112  Pulse: 130  Heart Rate: 130  Temp: 104  F (40  C)  Resp: 24  Height: 170.2 cm (5' 7\")  SpO2: 98 %      Physical Exam  Ill-appearing, tachycardic, tachypneic, speaking in 3-4 word sentences, audible expiratory wheeze, alert oriented  Head atraumatic normocephalic  Conjunctiva noninjected, oropharynx moist without lesions or erythema  No cervical adenopathy   Neck supple full active painless range of motion  Lungs diminished, scant diffuse extra Tory wheeze  Heart regular tachycardic no murmur  Abdomen soft nontender bowel sounds positive no masses or HSM  Strength and sensation grossly intact throughout the extremities, gait and " station normal  Speech is fluent, good eye contact, thought processes are rational  Lower extremities without swelling, redness or tenderness  Pedal pulses symmetrical and strong    ED Course        Procedures               Critical Care time:  none               Results for orders placed or performed during the hospital encounter of 12/26/18 (from the past 24 hour(s))   Rapid Strep Screen   Result Value Ref Range    Specimen Description Throat     Rapid Strep A Screen       NEGATIVE: No Group A streptococcal antigen detected by immunoassay, await culture report.   Comprehensive metabolic panel   Result Value Ref Range    Sodium 138 133 - 144 mmol/L    Potassium 3.8 3.4 - 5.3 mmol/L    Chloride 102 94 - 109 mmol/L    Carbon Dioxide 28 20 - 32 mmol/L    Anion Gap 8 3 - 14 mmol/L    Glucose 82 70 - 99 mg/dL    Urea Nitrogen 15 7 - 30 mg/dL    Creatinine 0.96 0.66 - 1.25 mg/dL    GFR Estimate >90 >60 mL/min/[1.73_m2]    GFR Estimate If Black >90 >60 mL/min/[1.73_m2]    Calcium 9.1 8.5 - 10.1 mg/dL    Bilirubin Total 0.2 0.2 - 1.3 mg/dL    Albumin 4.0 3.4 - 5.0 g/dL    Protein Total 7.9 6.8 - 8.8 g/dL    Alkaline Phosphatase 88 40 - 150 U/L    ALT 47 0 - 70 U/L    AST 27 0 - 45 U/L   CBC with platelets differential   Result Value Ref Range    WBC 15.5 (H) 4.0 - 11.0 10e9/L    RBC Count 5.20 4.4 - 5.9 10e12/L    Hemoglobin 14.4 13.3 - 17.7 g/dL    Hematocrit 45.9 40.0 - 53.0 %    MCV 88 78 - 100 fl    MCH 27.7 26.5 - 33.0 pg    MCHC 31.4 (L) 31.5 - 36.5 g/dL    RDW 12.7 10.0 - 15.0 %    Platelet Count 246 150 - 450 10e9/L    Diff Method Automated Method     % Neutrophils 83.0 %    % Lymphocytes 8.7 %    % Monocytes 6.0 %    % Eosinophils 1.2 %    % Basophils 0.6 %    % Immature Granulocytes 0.5 %    Nucleated RBCs 0 0 /100    Absolute Neutrophil 12.8 (H) 1.6 - 8.3 10e9/L    Absolute Lymphocytes 1.4 0.8 - 5.3 10e9/L    Absolute Monocytes 0.9 0.0 - 1.3 10e9/L    Absolute Eosinophils 0.2 0.0 - 0.7 10e9/L    Absolute  Basophils 0.1 0.0 - 0.2 10e9/L    Abs Immature Granulocytes 0.1 0 - 0.4 10e9/L    Absolute Nucleated RBC 0.0    Lactate for Sepsis Protocol   Result Value Ref Range    Lactate for Sepsis Protocol 2.1 (H) 0.7 - 2.0 mmol/L   Influenza A/B antigen   Result Value Ref Range    Influenza A/B Agn Specimen Nasopharyngeal     Influenza A Negative NEG^Negative    Influenza B Negative NEG^Negative   Beta strep group A culture   Result Value Ref Range    Specimen Description Throat     Special Requests Specimen collected in eSwab transport (white cap)     Culture Micro PENDING    XR Chest 2 Views    Narrative    XR CHEST 2 VW   12/26/2018 7:36 PM     HISTORY: cough    COMPARISON: Film dated 11/19/2018    FINDINGS: The heart is negative.  The lungs are clear. The pulmonary  vasculature is normal.  The bones and soft tissues are unremarkable.      Impression    IMPRESSION: Negative.    No change.    ALEXANDRA DE LEÓN MD       Medications   lidocaine 1 % 1 mL (not administered)   lidocaine (LMX4) cream (not administered)   sodium chloride (PF) 0.9% PF flush 3 mL (not administered)   sodium chloride (PF) 0.9% PF flush 3 mL (not administered)   0.9% sodium chloride BOLUS (0 mLs Intravenous Stopped 12/26/18 1900)   acetaminophen (TYLENOL) tablet 1,000 mg (1,000 mg Oral Given 12/26/18 1826)   ibuprofen (ADVIL/MOTRIN) tablet 800 mg (800 mg Oral Given 12/26/18 1826)   ipratropium - albuterol 0.5 mg/2.5 mg/3 mL (DUONEB) neb solution 3 mL (3 mLs Nebulization Given 12/26/18 1840)   0.9% sodium chloride BOLUS (0 mLs Intravenous Stopped 12/26/18 2129)       Assessments & Plan (with Medical Decision Making)  28-year-old male smoker history of asthma presents with cough, shortness of air, wheezing, headache, high fever.  Findings consistent with influenza-like illness.  Responded well to Tylenol, fluids.  His workup is unremarkable with exception of mild elevation of white count, rapid strep negative, flu negative, lactate barely elevated at 2.1.   Able to tolerate oral intake.  Discharged home.  No indication for further evaluation.  Return criteria reviewed.     I have reviewed the nursing notes.    I have reviewed the findings, diagnosis, plan and need for follow up with the patient.             Medication List      There are no discharge medications for this visit.         Final diagnoses:   Influenza-like illness       12/26/2018   [facility] EMERGENCY DEPARTMENT     Sal Ross MD  12/26/18 7534     college/career/technical training

## 2018-12-27 NOTE — DISCHARGE INSTRUCTIONS
Rest, drink plenty of fluids, inhalers as previously prescribed.  Return here for respiratory distress, vomiting, passing out or any other concern.  Take Tylenol 1000 mg every 4 hours as needed, and ibuprofen 600-800 mg every 6 hours as needed for fever and pain.

## 2018-12-28 LAB
BACTERIA SPEC CULT: ABNORMAL
Lab: ABNORMAL
SPECIMEN SOURCE: ABNORMAL

## 2018-12-28 NOTE — RESULT ENCOUNTER NOTE
Final Beta strep group A r/o culture is NEGATIVE for Group A streptococcus.    No treatment or change in treatment per [facility] Strep protocol.

## 2019-03-11 ENCOUNTER — HOSPITAL ENCOUNTER (EMERGENCY)
Facility: CLINIC | Age: 29
Discharge: HOME | End: 2019-03-11
Attending: PHYSICIAN ASSISTANT | Admitting: PHYSICIAN ASSISTANT
Payer: COMMERCIAL

## 2019-03-11 VITALS
RESPIRATION RATE: 16 BRPM | TEMPERATURE: 98.3 F | SYSTOLIC BLOOD PRESSURE: 132 MMHG | HEIGHT: 66 IN | HEART RATE: 96 BPM | OXYGEN SATURATION: 97 % | DIASTOLIC BLOOD PRESSURE: 86 MMHG | BODY MASS INDEX: 35.51 KG/M2

## 2019-03-11 DIAGNOSIS — J32.0 CHRONIC MAXILLARY SINUSITIS: ICD-10-CM

## 2019-03-11 PROCEDURE — G0463 HOSPITAL OUTPT CLINIC VISIT: HCPCS | Performed by: PHYSICIAN ASSISTANT

## 2019-03-11 PROCEDURE — 99214 OFFICE O/P EST MOD 30 MIN: CPT | Mod: Z6 | Performed by: PHYSICIAN ASSISTANT

## 2019-03-11 RX ORDER — ASPIRIN 81 MG/1
81 TABLET, CHEWABLE ORAL
COMMUNITY
Start: 2019-03-01 | End: 2019-03-31

## 2019-03-11 RX ORDER — AMOXICILLIN 875 MG
875 TABLET ORAL 2 TIMES DAILY
Qty: 20 TABLET | Refills: 0 | Status: SHIPPED | OUTPATIENT
Start: 2019-03-11 | End: 2019-03-21

## 2019-03-11 ASSESSMENT — ENCOUNTER SYMPTOMS
SINUS PAIN: 1
LIGHT-HEADEDNESS: 0
DIZZINESS: 0
SHORTNESS OF BREATH: 0
RHINORRHEA: 1
FEVER: 0
SINUS PRESSURE: 1
WHEEZING: 0
PALPITATIONS: 0
COUGH: 1
FATIGUE: 0
SORE THROAT: 0
HEADACHES: 1

## 2019-03-11 NOTE — ED PROVIDER NOTES
History     Chief Complaint   Patient presents with     Sinusitis     HPI  Gianluca Ortiz is a 28 year old male who presents to the urgent care with 1 week of sinus symptoms.     ENT Symptoms             Symptoms: cc Present Absent Comment   Fever/Chills   x    Fatigue  x     Muscle Aches   x    Eye Irritation   x    Sneezing   x    Nasal Henry/Drg  x     Sinus Pressure/Pain x   Bilateral maxillary sinus pain.    Loss of smell   x    Dental pain   x    Sore Throat   x    Swollen Glands   x    Ear Pain/Fullness  x  pressure   Cough  x  Occasional    Wheeze   x    Chest Pain   x    Shortness of breath   x    Rash   x    Other   x  Headache      Symptom duration:  1 week.    Symptom severity:  mild and getting worse.    Treatments tried:  over the counter medications and nasal sprays.    Contacts:  none known.      Patient states he has been seen by ENT and had imaging done with no known cause. Patient has also had allergy testing done. Patient states that if he cannot get rid of his symptoms within a week they usually worsen very quickly and he ends up hospitalized.  Patient here today because of symptoms are slowly progressively getting worse and he does not want this to cause a hospitalization.  Patient recent cardiac ablation for cardiac arrhythmias.  Patient states he is no longer smokes and has been tobacco free for the past 8 years.    Problem list, Medication list, Allergies, and Medical/Social/Surgical histories reviewed in [facility] and updated as appropriate.      Allergies:  No Known Allergies    Problem List:    There are no active problems to display for this patient.       Past Medical History:    History reviewed. No pertinent past medical history.    Past Surgical History:    History reviewed. No pertinent surgical history.    Family History:    History reviewed. No pertinent family history.    Social History:  Marital Status:  Single [1]  Social History     Tobacco Use     Smoking status: Former  "Smoker     Packs/day: 0.50     Years: 5.00     Pack years: 2.50     Types: Cigarettes     Smokeless tobacco: Current User   Substance Use Topics     Alcohol use: Yes     Drug use: No        Medications:      amoxicillin (AMOXIL) 875 MG tablet   aspirin (ASA) 81 MG chewable tablet   albuterol (PROAIR HFA/PROVENTIL HFA/VENTOLIN HFA) 108 (90 Base) MCG/ACT inhaler   diphenhydrAMINE (BENADRYL) 25 MG tablet   fluticasone (FLOVENT HFA) 110 MCG/ACT inhaler   montelukast (SINGULAIR) 10 MG tablet         Review of Systems   Constitutional: Negative for fatigue and fever.   HENT: Positive for congestion, ear pain (pressure), postnasal drip, rhinorrhea, sinus pressure and sinus pain. Negative for sore throat.    Respiratory: Positive for cough (occasional ). Negative for shortness of breath and wheezing.    Cardiovascular: Negative for chest pain, palpitations and leg swelling.   Skin: Negative for rash.   Neurological: Positive for headaches. Negative for dizziness and light-headedness.   All other systems reviewed and are negative.      Physical Exam   BP: 132/86  Pulse: 96  Temp: 98.3  F (36.8  C)  Resp: 16  Height: 167.6 cm (5' 6\")  SpO2: 97 %      Physical Exam       Constitutional: healthy, alert, no distress, cooperative and smiling   Cardiovascular: RRR. No murmurs, clicks gallops or rub  Respiratory: Lungs clear to auscultation bilaterally. No rales, rhonchi, wheezing appreciated. No accessory muscle use or retractions.   Neck: Neck supple. No adenopathy. Thyroid symmetric, normal size,  ENT: bilateral TM normal without fluid or infection, neck without nodes, pharynx erythematous without exudate, bilateraly maxillary sinus tenderness, post nasal drip noted and nasal mucosa congested  NEURO: Gait normal. Reflexes normal and symmetric. Sensation grossly WNL.  SKIN: no suspicious lesions or rashes    No results found for this or any previous visit (from the past 24 hour(s)).        ED Course        Procedures        "       Critical Care time:  none               No results found for this or any previous visit (from the past 24 hour(s)).    Medications - No data to display    Assessments & Plan (with Medical Decision Making)     I have reviewed the nursing notes.    I have reviewed the findings, diagnosis, plan and need for follow up with the patient.   28-year-old male presents the urgent care with sinus symptoms for the past week.  Patient states in the past if he has not been placed on antibiotics for his chronic sinus infections he ends up getting very sick has been hospitalized for this.  Patient has seen ENT for chronic sinusitis, but states they have not found a cause.  See exam findings above.  Discussed with patient that at this time symptoms appear to be slightly more viral in origin, but due to patient's history and the fact that he is very adamant about getting an antibiotic amoxicillin twice daily given to patient for 10 days for treatment of acute maxillary sinusitis with history of chronic sinusitis.  Patient increase fluids, rest, Tylenol and ibuprofen over-the-counter as needed as long as no contraindications, warm compresses over the sinuses, nasal saline sprays.  Patient follow-up with primary care doctor in the next week if symptoms persist or fail to improve.  Patient to return sooner if symptoms worsen or change.  She discharged in stable condition.       Medication List      Started    amoxicillin 875 MG tablet  Commonly known as:  AMOXIL  875 mg, Oral, 2 TIMES DAILY        ASK your doctor about these medications    ondansetron 4 MG ODT tab  Commonly known as:  ZOFRAN ODT  4 mg, Oral, EVERY 8 HOURS PRN  Ask about: Should I take this medication?            Final diagnoses:   Chronic maxillary sinusitis       3/11/2019   [facility] EMERGENCY DEPARTMENT     Juliette Burns PA-C  03/11/19 1900

## 2019-03-11 NOTE — ED AVS SNAPSHOT
[facility] Emergency Department  [address] [address]  Phone:  [phone]  Fax:  [phone]                                    Gianluca Ortiz   MRN: [id number]    Department:  [facility] Emergency Department   Date of Visit:  3/11/2019           After Visit Summary Signature Page    I have received my discharge instructions, and my questions have been answered. I have discussed any challenges I see with this plan with the nurse or doctor.    ..........................................................................................................................................  Patient/Patient Representative Signature      ..........................................................................................................................................  Patient Representative Print Name and Relationship to Patient    ..................................................               ................................................  Date                                   Time    ..........................................................................................................................................  Reviewed by Signature/Title    ...................................................              ..............................................  Date                                               Time          22EPIC Rev 08/18

## 2019-03-11 NOTE — DISCHARGE INSTRUCTIONS
Use Medication as directed    Patient informed to rest, warm compresses over sinuses as needed, stay hydrated, cool humidifier, salt water gargles, and nasal saline sprays as needed.  Lots of rest and fluids.  Tylenol or ibuprofen as needed.    Return if any worsening symptoms or if not improving.  Follow up with primary care provider in 1weeks.    Go to Emergency Room if sx worsen or change, worst headache of life, visual changes, confusion, fevers > 104F occur.     Patient voiced understanding of instructions given.

## 2019-03-11 NOTE — ED TRIAGE NOTES
Patient presents today with sinus pain. Symptoms started a week ago. Arrived to urgent care ambulatory.

## 2023-12-16 ENCOUNTER — OFFICE VISIT (OUTPATIENT)
Dept: URGENT CARE | Facility: URGENT CARE | Age: 33
End: 2023-12-16
Payer: COMMERCIAL

## 2023-12-16 ENCOUNTER — ANCILLARY PROCEDURE (OUTPATIENT)
Dept: GENERAL RADIOLOGY | Facility: CLINIC | Age: 33
End: 2023-12-16
Attending: PHYSICIAN ASSISTANT
Payer: COMMERCIAL

## 2023-12-16 VITALS
SYSTOLIC BLOOD PRESSURE: 135 MMHG | HEART RATE: 72 BPM | DIASTOLIC BLOOD PRESSURE: 83 MMHG | BODY MASS INDEX: 36.96 KG/M2 | WEIGHT: 229 LBS | OXYGEN SATURATION: 96 % | RESPIRATION RATE: 19 BRPM | TEMPERATURE: 97.9 F

## 2023-12-16 DIAGNOSIS — M79.672 LEFT FOOT PAIN: ICD-10-CM

## 2023-12-16 DIAGNOSIS — S92.502A CLOSED FRACTURE OF FIFTH TOE OF LEFT FOOT, INITIAL ENCOUNTER: Primary | ICD-10-CM

## 2023-12-16 PROCEDURE — 73630 X-RAY EXAM OF FOOT: CPT | Mod: TC | Performed by: RADIOLOGY

## 2023-12-16 PROCEDURE — 99203 OFFICE O/P NEW LOW 30 MIN: CPT | Performed by: PHYSICIAN ASSISTANT

## 2023-12-16 RX ORDER — IBUPROFEN 600 MG/1
600 TABLET, FILM COATED ORAL EVERY 8 HOURS
COMMUNITY
Start: 2023-12-12 | End: 2024-02-10

## 2023-12-16 RX ORDER — METHYLPREDNISOLONE 4 MG
TABLET, DOSE PACK ORAL
Qty: 21 TABLET | Refills: 0 | Status: SHIPPED | OUTPATIENT
Start: 2023-12-16

## 2023-12-16 NOTE — PROGRESS NOTES
Assessment & Plan     1. Closed fracture of fifth toe of left foot, initial encounter  New base of [facility] nondisplaced    - Orthopedic  Referral; Future  - methylPREDNISolone (MEDROL DOSEPAK) 4 MG tablet therapy pack; Follow Package Directions  Dispense: 21 tablet; Refill: 0    2. Left foot pain    - XR Foot Left G/E 3 Views; Future  - Orthopedic  Referral; Future  - methylPREDNISolone (MEDROL DOSEPAK) 4 MG tablet therapy pack; Follow Package Directions  Dispense: 21 tablet; Refill: 0     Follow up with orthopedics. Boot and crutches non weight bearing until seen.                 ANALY Meng [facility] CARE [place]    Aura Hough is a 33 year old male who presents to clinic today for the following health issues:  Chief Complaint   Patient presents with    Foot Injury     Left foot, saw specialist on Monday/Tuesday for foot, says has an old fracture on the pinky side of foot, yesterday slipped on a sock and fell down the stairs, not sure if refractured on just bruised the tendon.      HPI    Here for left foot pain. Recently seen with negative fracture. Then yesterday fell down the stairs. Worried about fracture. 6 months ago hurt the foot as well.           Review of Systems        Objective    /83   Pulse 72   Temp 97.9  F (36.6  C) (Tympanic)   Resp 19   Wt 103.9 kg (229 lb)   SpO2 96%   BMI 36.96 kg/m    Physical Exam  Musculoskeletal:        Feet:    Feet:      Comments: Moderate to severe tenderness.

## 2023-12-20 ENCOUNTER — OFFICE VISIT (OUTPATIENT)
Dept: PODIATRY | Facility: OTHER | Age: 33
End: 2023-12-20
Attending: PHYSICIAN ASSISTANT
Payer: COMMERCIAL

## 2023-12-20 VITALS
HEART RATE: 77 BPM | TEMPERATURE: 98 F | DIASTOLIC BLOOD PRESSURE: 84 MMHG | SYSTOLIC BLOOD PRESSURE: 137 MMHG | BODY MASS INDEX: 36.73 KG/M2 | HEIGHT: 67 IN | WEIGHT: 234 LBS

## 2023-12-20 DIAGNOSIS — S92.502A CLOSED FRACTURE OF FIFTH TOE OF LEFT FOOT, INITIAL ENCOUNTER: ICD-10-CM

## 2023-12-20 DIAGNOSIS — S99.192A CLOSED FRACTURE OF BASE OF FIFTH METATARSAL BONE OF LEFT FOOT AT METAPHYSEAL-DIAPHYSEAL JUNCTION, INITIAL ENCOUNTER: Primary | ICD-10-CM

## 2023-12-20 PROCEDURE — 99203 OFFICE O/P NEW LOW 30 MIN: CPT | Performed by: PODIATRIST

## 2023-12-20 ASSESSMENT — PAIN SCALES - GENERAL: PAINLEVEL: SEVERE PAIN (6)

## 2023-12-20 NOTE — LETTER
12/20/2023         RE: Gianluca Ortiz  [address]  [place] [address]        Dear Colleague,    Thank you for referring your patient, Gianluca Ortiz, to the [facility]. Please see a copy of my visit note below.    HPI:  Gianluca Ortiz is a 33 year old male who is seen in consultation at the request of URGENT CARE - Mati Cristobal PA-C    Pt presents for eval of:   (Onset, Location, L/R, Character, Treatments, Injury if yes)    XR Left foot 12/16/2023     Onset 12/15/2023, slipped going down the stairs falling down 5 stairs. Summer 2023 injury to dorsal Left foot when stepping on uneven terrain. Presents with lateral Left foot pain, WB w/short gray fx boot.  Constant, swelling. Intermittent sharp, shooting, throbbing, pain 6-8/10.    Wearing fx boot for daily activity only, ice, ibuprofen, elevation    Works as , laid off in winter months.    ROS:  10 point ROS neg other than the symptoms noted above in the HPI.    There is no problem list on file for this patient.      PAST MEDICAL HISTORY: History reviewed. No pertinent past medical history.     PAST SURGICAL HISTORY: History reviewed. No pertinent surgical history.     MEDICATIONS:   Current Outpatient Medications:      ibuprofen (ADVIL/MOTRIN) 600 MG tablet, Take 600 mg by mouth every 8 hours, Disp: , Rfl:      methylPREDNISolone (MEDROL DOSEPAK) 4 MG tablet therapy pack, Follow Package Directions, Disp: 21 tablet, Rfl: 0     albuterol (PROAIR HFA/PROVENTIL HFA/VENTOLIN HFA) 108 (90 Base) MCG/ACT inhaler, Inhale 2 puffs into the lungs every 6 hours as needed for shortness of breath / dyspnea or wheezing (Patient not taking: Reported on 12/16/2023), Disp: 1 Inhaler, Rfl: 0     diphenhydrAMINE (BENADRYL) 25 MG tablet, Take 1 tablet by mouth every 6 hours as needed for itching. (Patient not taking: Reported on 12/16/2023), Disp: 30 tablet, Rfl: 0     fluticasone (FLOVENT HFA) 110 MCG/ACT  Left VM to call us back   Also sent [employer] message to call us with condition update. Will await call back or message back. "inhaler, Inhale 1 puff into the lungs 2 times daily (Patient not taking: Reported on 12/16/2023), Disp: , Rfl:      montelukast (SINGULAIR) 10 MG tablet, Take 10 mg by mouth At Bedtime (Patient not taking: Reported on 12/16/2023), Disp: , Rfl:      ALLERGIES:  No Known Allergies     SOCIAL HISTORY:   Social History     Socioeconomic History     Marital status: Single     Spouse name: Not on file     Number of children: Not on file     Years of education: Not on file     Highest education level: Not on file   Occupational History     Not on file   Tobacco Use     Smoking status: Former     Packs/day: 0.50     Years: 5.00     Additional pack years: 0.00     Total pack years: 2.50     Types: Cigarettes     Smokeless tobacco: Current     Types: Chew   Substance and Sexual Activity     Alcohol use: Yes     Drug use: No     Sexual activity: Not on file   Other Topics Concern     Parent/sibling w/ CABG, MI or angioplasty before 65F 55M? Not Asked   Social History Narrative     Not on file     Social Determinants of Health     Financial Resource Strain: Not on file   Food Insecurity: Not on file   Transportation Needs: Not on file   Physical Activity: Not on file   Stress: Not on file   Social Connections: Not on file   Interpersonal Safety: Not on file   Housing Stability: Not on file        FAMILY HISTORY: History reviewed. No pertinent family history.     EXAM:Vitals: /84 (BP Location: Left arm, Patient Position: Sitting, Cuff Size: Adult Regular)   Pulse 77   Temp 98  F (36.7  C) (Temporal)   Ht 1.702 m (5' 7\")   Wt 106.1 kg (234 lb)   BMI 36.65 kg/m    BMI= Body mass index is 36.65 kg/m .    General appearance: Patient is alert and fully cooperative with history & exam.  No sign of distress is noted during the visit.     Psychiatric: Affect is pleasant & appropriate.  Patient appears motivated to improve health.     Respiratory: Breathing is regular & unlabored while sitting.     HEENT: Hearing is intact to " spoken word.  Speech is clear.  No gross evidence of visual impairment that would impact ambulation.     Vascular: DP & PT pulses are intact & regular bilaterally.  No significant edema or varicosities noted.  CFT and skin temperature is normal to both lower extremities.     Neurologic: Lower extremity sensation is intact to light touch.  No evidence of weakness or contracture in the lower extremities.  No evidence of neuropathy.    Dermatologic: Skin is intact to both lower extremities with adequate texture, turgor and tone about the integument.  No paronychia or evidence of soft tissue infection is noted.     Musculoskeletal: Patient is ambulatory in a short fracture boot.  Pain and swelling noted about the left fifth metatarsal base at the diaphyseal metaphyseal junction.  Pain with direct palpation or with activation of the peroneal tendons.    Radiographs: 3 views left foot demonstrate fracture of the fifth metatarsal at the metaphyseal diaphyseal junction.     ASSESSMENT:       ICD-10-CM    1. Closed fracture of base of fifth metatarsal bone of left foot at metaphyseal-diaphyseal junction, initial encounter  S99.192A       2. Closed fracture of fifth toe of left foot, initial encounter  S92.502A Orthopedic  Referral     XR Foot Left G/E 3 Views           PLAN:  Reviewed patient's chart in [facility].      12/20/2023   Interpreted radiographs demonstrating Crouch fracture  Discussed risk of nonunion  Recommend discontinuing nicotine  Recommend nonweightbearing with crutches which she has  Recommend a tall fracture boot and how to obtain this to provide most appropriate protection.  This is not a simple metatarsal fracture but rather a Crouch fracture and is a dysvascular fracture.  Discussed how nicotine contributes to delayed healing.  Follow-up in 3 weeks to repeat imaging to confirm alignment.      Sal Rosario DPM      Again, thank you for allowing me to participate in the care of your patient.         Sincerely,        Sal Rosario DPM

## 2023-12-20 NOTE — PROGRESS NOTES
HPI:  Gianluca Ortiz is a 33 year old male who is seen in consultation at the request of URGENT CARE - Mati Cristobal PA-C    Pt presents for eval of:   (Onset, Location, L/R, Character, Treatments, Injury if yes)    XR Left foot 12/16/2023     Onset 12/15/2023, slipped going down the stairs falling down 5 stairs. Summer 2023 injury to dorsal Left foot when stepping on uneven terrain. Presents with lateral Left foot pain, WB w/short gray fx boot.  Constant, swelling. Intermittent sharp, shooting, throbbing, pain 6-8/10.    Wearing fx boot for daily activity only, ice, ibuprofen, elevation    Works as , laid off in winter months.    ROS:  10 point ROS neg other than the symptoms noted above in the HPI.    There is no problem list on file for this patient.      PAST MEDICAL HISTORY: History reviewed. No pertinent past medical history.     PAST SURGICAL HISTORY: History reviewed. No pertinent surgical history.     MEDICATIONS:   Current Outpatient Medications:     ibuprofen (ADVIL/MOTRIN) 600 MG tablet, Take 600 mg by mouth every 8 hours, Disp: , Rfl:     methylPREDNISolone (MEDROL DOSEPAK) 4 MG tablet therapy pack, Follow Package Directions, Disp: 21 tablet, Rfl: 0    albuterol (PROAIR HFA/PROVENTIL HFA/VENTOLIN HFA) 108 (90 Base) MCG/ACT inhaler, Inhale 2 puffs into the lungs every 6 hours as needed for shortness of breath / dyspnea or wheezing (Patient not taking: Reported on 12/16/2023), Disp: 1 Inhaler, Rfl: 0    diphenhydrAMINE (BENADRYL) 25 MG tablet, Take 1 tablet by mouth every 6 hours as needed for itching. (Patient not taking: Reported on 12/16/2023), Disp: 30 tablet, Rfl: 0    fluticasone (FLOVENT HFA) 110 MCG/ACT inhaler, Inhale 1 puff into the lungs 2 times daily (Patient not taking: Reported on 12/16/2023), Disp: , Rfl:     montelukast (SINGULAIR) 10 MG tablet, Take 10 mg by mouth At Bedtime (Patient not taking: Reported on 12/16/2023), Disp: , Rfl:      ALLERGIES:  No Known  "Allergies     SOCIAL HISTORY:   Social History     Socioeconomic History    Marital status: Single     Spouse name: Not on file    Number of children: Not on file    Years of education: Not on file    Highest education level: Not on file   Occupational History    Not on file   Tobacco Use    Smoking status: Former     Packs/day: 0.50     Years: 5.00     Additional pack years: 0.00     Total pack years: 2.50     Types: Cigarettes    Smokeless tobacco: Current     Types: Chew   Substance and Sexual Activity    Alcohol use: Yes    Drug use: No    Sexual activity: Not on file   Other Topics Concern    Parent/sibling w/ CABG, MI or angioplasty before 65F 55M? Not Asked   Social History Narrative    Not on file     Social Determinants of Health     Financial Resource Strain: Not on file   Food Insecurity: Not on file   Transportation Needs: Not on file   Physical Activity: Not on file   Stress: Not on file   Social Connections: Not on file   Interpersonal Safety: Not on file   Housing Stability: Not on file        FAMILY HISTORY: History reviewed. No pertinent family history.     EXAM:Vitals: /84 (BP Location: Left arm, Patient Position: Sitting, Cuff Size: Adult Regular)   Pulse 77   Temp 98  F (36.7  C) (Temporal)   Ht 1.702 m (5' 7\")   Wt 106.1 kg (234 lb)   BMI 36.65 kg/m    BMI= Body mass index is 36.65 kg/m .    General appearance: Patient is alert and fully cooperative with history & exam.  No sign of distress is noted during the visit.     Psychiatric: Affect is pleasant & appropriate.  Patient appears motivated to improve health.     Respiratory: Breathing is regular & unlabored while sitting.     HEENT: Hearing is intact to spoken word.  Speech is clear.  No gross evidence of visual impairment that would impact ambulation.     Vascular: DP & PT pulses are intact & regular bilaterally.  No significant edema or varicosities noted.  CFT and skin temperature is normal to both lower extremities.   "   Neurologic: Lower extremity sensation is intact to light touch.  No evidence of weakness or contracture in the lower extremities.  No evidence of neuropathy.    Dermatologic: Skin is intact to both lower extremities with adequate texture, turgor and tone about the integument.  No paronychia or evidence of soft tissue infection is noted.     Musculoskeletal: Patient is ambulatory in a short fracture boot.  Pain and swelling noted about the left fifth metatarsal base at the diaphyseal metaphyseal junction.  Pain with direct palpation or with activation of the peroneal tendons.    Radiographs: 3 views left foot demonstrate fracture of the fifth metatarsal at the metaphyseal diaphyseal junction.     ASSESSMENT:       ICD-10-CM    1. Closed fracture of base of fifth metatarsal bone of left foot at metaphyseal-diaphyseal junction, initial encounter  S99.192A       2. Closed fracture of fifth toe of left foot, initial encounter  S92.502A Orthopedic  Referral     XR Foot Left G/E 3 Views           PLAN:  Reviewed patient's chart in [facility].      12/20/2023   Interpreted radiographs demonstrating Crouch fracture  Discussed risk of nonunion  Recommend discontinuing nicotine  Recommend nonweightbearing with crutches which she has  Recommend a tall fracture boot and how to obtain this to provide most appropriate protection.  This is not a simple metatarsal fracture but rather a Crouch fracture and is a dysvascular fracture.  Discussed how nicotine contributes to delayed healing.  Follow-up in 3 weeks to repeat imaging to confirm alignment.      Sal Rosario DPM

## 2024-01-28 ENCOUNTER — HEALTH MAINTENANCE LETTER (OUTPATIENT)
Age: 34
End: 2024-01-28

## 2024-02-05 ENCOUNTER — DOCUMENTATION ONLY (OUTPATIENT)
Dept: URGENT CARE | Facility: URGENT CARE | Age: 34
End: 2024-02-05
Payer: COMMERCIAL

## 2024-02-05 DIAGNOSIS — S99.192A OTHER PHYSEAL FRACTURE OF LEFT METATARSAL, INITIAL ENCOUNTER FOR CLOSED FRACTURE: Primary | ICD-10-CM

## 2024-02-18 ENCOUNTER — OFFICE VISIT (OUTPATIENT)
Dept: URGENT CARE | Facility: URGENT CARE | Age: 34
End: 2024-02-18
Payer: COMMERCIAL

## 2024-02-18 VITALS
SYSTOLIC BLOOD PRESSURE: 135 MMHG | OXYGEN SATURATION: 100 % | HEART RATE: 113 BPM | RESPIRATION RATE: 18 BRPM | WEIGHT: 232 LBS | DIASTOLIC BLOOD PRESSURE: 75 MMHG | BODY MASS INDEX: 36.34 KG/M2 | TEMPERATURE: 101.5 F

## 2024-02-18 DIAGNOSIS — J10.1 INFLUENZA A: Primary | ICD-10-CM

## 2024-02-18 LAB
FLUAV AG SPEC QL IA: POSITIVE
FLUBV AG SPEC QL IA: NEGATIVE

## 2024-02-18 PROCEDURE — 87804 INFLUENZA ASSAY W/OPTIC: CPT | Performed by: EMERGENCY MEDICINE

## 2024-02-18 PROCEDURE — 87635 SARS-COV-2 COVID-19 AMP PRB: CPT | Performed by: EMERGENCY MEDICINE

## 2024-02-18 PROCEDURE — 99213 OFFICE O/P EST LOW 20 MIN: CPT | Performed by: EMERGENCY MEDICINE

## 2024-02-18 RX ORDER — OSELTAMIVIR PHOSPHATE 75 MG/1
75 CAPSULE ORAL 2 TIMES DAILY
Qty: 10 CAPSULE | Refills: 0 | Status: SHIPPED | OUTPATIENT
Start: 2024-02-18 | End: 2024-02-23

## 2024-02-18 RX ORDER — CODEINE PHOSPHATE AND GUAIFENESIN 10; 100 MG/5ML; MG/5ML
1-2 SOLUTION ORAL EVERY 4 HOURS PRN
Qty: 473 ML | Refills: 0 | Status: SHIPPED | OUTPATIENT
Start: 2024-02-18

## 2024-02-18 NOTE — PROGRESS NOTES
CHIEF COMPLAINT: Cough, fever      HPI: Patient is a 33-year-old male who presents with a 3-day history of fever and cough.  He does have mild dyspnea due to severe frequent coughing.  COVID testing at home negative.  He does have some throat discomfort as well.  He is coughed so hard that he hurt his left ribs.      ROS: See HPI otherwise normal.  His exam is a 33-year-old male who is in no acute respiratory distress but appears in visibly uncomfortable.  Temp 101.5.  Pulse 113    No Known Allergies   Current Outpatient Medications   Medication Sig Dispense Refill    albuterol (PROAIR HFA/PROVENTIL HFA/VENTOLIN HFA) 108 (90 Base) MCG/ACT inhaler Inhale 2 puffs into the lungs every 6 hours as needed for shortness of breath / dyspnea or wheezing (Patient not taking: Reported on 12/16/2023) 1 Inhaler 0    diphenhydrAMINE (BENADRYL) 25 MG tablet Take 1 tablet by mouth every 6 hours as needed for itching. (Patient not taking: Reported on 12/16/2023) 30 tablet 0    fluticasone (FLOVENT HFA) 110 MCG/ACT inhaler Inhale 1 puff into the lungs 2 times daily (Patient not taking: Reported on 12/16/2023)      methylPREDNISolone (MEDROL DOSEPAK) 4 MG tablet therapy pack Follow Package Directions (Patient not taking: Reported on 2/18/2024) 21 tablet 0    montelukast (SINGULAIR) 10 MG tablet Take 10 mg by mouth At Bedtime (Patient not taking: Reported on 12/16/2023)           PE:, Respiratory rate 18 with pulse ox 100%.  HEENT reveals moist mucous membranes.  Posterior pharynx is mildly erythematous without exudate or abscess.  Lungs are diminished throughout but no libby wheezes rales or rhonchi heard.  Heart regular.        TREATMENT: Ibuprofen 600 mg given orally.  COVID PCR sent.  Influenza:      ASSESSMENT: Acute influenza with relatively severe symptoms but vital signs stable.      DIAGNOSIS: Influenza A      PLAN: Tamiflu, Cheratussin AC, see AVS

## 2024-02-18 NOTE — PATIENT INSTRUCTIONS
Start Tamiflu today  Cheratussin AC cough medicine will cause drowsiness.  Tylenol or ibuprofen every 6 hours.  Recheck 48 hours if no improvement.  Be seen sooner if more ill

## 2024-02-19 LAB — SARS-COV-2 RNA RESP QL NAA+PROBE: NEGATIVE

## 2024-03-26 ENCOUNTER — HOSPITAL ENCOUNTER (EMERGENCY)
Facility: CLINIC | Age: 34
Discharge: HOME | End: 2024-03-26
Attending: FAMILY MEDICINE | Admitting: FAMILY MEDICINE
Payer: COMMERCIAL

## 2024-03-26 VITALS
HEIGHT: 67 IN | OXYGEN SATURATION: 97 % | BODY MASS INDEX: 34.84 KG/M2 | RESPIRATION RATE: 20 BRPM | SYSTOLIC BLOOD PRESSURE: 123 MMHG | TEMPERATURE: 98.5 F | DIASTOLIC BLOOD PRESSURE: 69 MMHG | WEIGHT: 222 LBS | HEART RATE: 90 BPM

## 2024-03-26 DIAGNOSIS — R11.2 NAUSEA VOMITING AND DIARRHEA: ICD-10-CM

## 2024-03-26 DIAGNOSIS — R19.7 NAUSEA VOMITING AND DIARRHEA: ICD-10-CM

## 2024-03-26 LAB
ALBUMIN SERPL BCG-MCNC: 4.1 G/DL (ref 3.5–5.2)
ALP SERPL-CCNC: 82 U/L (ref 40–150)
ALT SERPL W P-5'-P-CCNC: 32 U/L (ref 0–70)
ANION GAP SERPL CALCULATED.3IONS-SCNC: 9 MMOL/L (ref 7–15)
AST SERPL W P-5'-P-CCNC: 22 U/L (ref 0–45)
BASOPHILS # BLD AUTO: 0 10E3/UL (ref 0–0.2)
BASOPHILS NFR BLD AUTO: 0 %
BILIRUB SERPL-MCNC: 0.5 MG/DL
BUN SERPL-MCNC: 14.2 MG/DL (ref 6–20)
CALCIUM SERPL-MCNC: 9.2 MG/DL (ref 8.6–10)
CHLORIDE SERPL-SCNC: 99 MMOL/L (ref 98–107)
CREAT SERPL-MCNC: 0.99 MG/DL (ref 0.67–1.17)
DEPRECATED HCO3 PLAS-SCNC: 25 MMOL/L (ref 22–29)
EGFRCR SERPLBLD CKD-EPI 2021: >90 ML/MIN/1.73M2
EOSINOPHIL # BLD AUTO: 0.1 10E3/UL (ref 0–0.7)
EOSINOPHIL NFR BLD AUTO: 1 %
ERYTHROCYTE [DISTWIDTH] IN BLOOD BY AUTOMATED COUNT: 13 % (ref 10–15)
GLUCOSE SERPL-MCNC: 109 MG/DL (ref 70–99)
HCT VFR BLD AUTO: 45 % (ref 40–53)
HGB BLD-MCNC: 14.8 G/DL (ref 13.3–17.7)
IMM GRANULOCYTES # BLD: 0.1 10E3/UL
IMM GRANULOCYTES NFR BLD: 1 %
LYMPHOCYTES # BLD AUTO: 0.6 10E3/UL (ref 0.8–5.3)
LYMPHOCYTES NFR BLD AUTO: 6 %
MCH RBC QN AUTO: 28.2 PG (ref 26.5–33)
MCHC RBC AUTO-ENTMCNC: 32.9 G/DL (ref 31.5–36.5)
MCV RBC AUTO: 86 FL (ref 78–100)
MONOCYTES # BLD AUTO: 0.4 10E3/UL (ref 0–1.3)
MONOCYTES NFR BLD AUTO: 4 %
NEUTROPHILS # BLD AUTO: 9.7 10E3/UL (ref 1.6–8.3)
NEUTROPHILS NFR BLD AUTO: 89 %
NRBC # BLD AUTO: 0 10E3/UL
NRBC BLD AUTO-RTO: 0 /100
PLATELET # BLD AUTO: 246 10E3/UL (ref 150–450)
POTASSIUM SERPL-SCNC: 4 MMOL/L (ref 3.4–5.3)
PROT SERPL-MCNC: 6.7 G/DL (ref 6.4–8.3)
RBC # BLD AUTO: 5.25 10E6/UL (ref 4.4–5.9)
SODIUM SERPL-SCNC: 133 MMOL/L (ref 135–145)
WBC # BLD AUTO: 10.9 10E3/UL (ref 4–11)

## 2024-03-26 PROCEDURE — 99284 EMERGENCY DEPT VISIT MOD MDM: CPT | Performed by: FAMILY MEDICINE

## 2024-03-26 PROCEDURE — 250N000011 HC RX IP 250 OP 636: Performed by: FAMILY MEDICINE

## 2024-03-26 PROCEDURE — 85025 COMPLETE CBC W/AUTO DIFF WBC: CPT | Performed by: FAMILY MEDICINE

## 2024-03-26 PROCEDURE — 36415 COLL VENOUS BLD VENIPUNCTURE: CPT | Performed by: FAMILY MEDICINE

## 2024-03-26 PROCEDURE — 99284 EMERGENCY DEPT VISIT MOD MDM: CPT | Mod: 25 | Performed by: FAMILY MEDICINE

## 2024-03-26 PROCEDURE — 96375 TX/PRO/DX INJ NEW DRUG ADDON: CPT | Performed by: FAMILY MEDICINE

## 2024-03-26 PROCEDURE — 80053 COMPREHEN METABOLIC PANEL: CPT | Performed by: FAMILY MEDICINE

## 2024-03-26 PROCEDURE — 96365 THER/PROPH/DIAG IV INF INIT: CPT | Performed by: FAMILY MEDICINE

## 2024-03-26 PROCEDURE — 96361 HYDRATE IV INFUSION ADD-ON: CPT | Performed by: FAMILY MEDICINE

## 2024-03-26 PROCEDURE — 258N000003 HC RX IP 258 OP 636: Performed by: FAMILY MEDICINE

## 2024-03-26 RX ORDER — KETOROLAC TROMETHAMINE 15 MG/ML
10 INJECTION, SOLUTION INTRAMUSCULAR; INTRAVENOUS ONCE
Status: COMPLETED | OUTPATIENT
Start: 2024-03-26 | End: 2024-03-26

## 2024-03-26 RX ORDER — ONDANSETRON 2 MG/ML
4 INJECTION INTRAMUSCULAR; INTRAVENOUS
Status: DISCONTINUED | OUTPATIENT
Start: 2024-03-26 | End: 2024-03-26 | Stop reason: HOSPADM

## 2024-03-26 RX ORDER — ONDANSETRON 4 MG/1
4-8 TABLET, ORALLY DISINTEGRATING ORAL EVERY 8 HOURS PRN
Qty: 12 TABLET | Refills: 0 | Status: SHIPPED | OUTPATIENT
Start: 2024-03-26

## 2024-03-26 RX ADMIN — SODIUM CHLORIDE, POTASSIUM CHLORIDE, SODIUM LACTATE AND CALCIUM CHLORIDE 1000 ML: 600; 310; 30; 20 INJECTION, SOLUTION INTRAVENOUS at 19:51

## 2024-03-26 RX ADMIN — ONDANSETRON 4 MG: 2 INJECTION INTRAMUSCULAR; INTRAVENOUS at 19:59

## 2024-03-26 RX ADMIN — SODIUM CHLORIDE 1000 ML: 9 INJECTION, SOLUTION INTRAVENOUS at 19:58

## 2024-03-26 RX ADMIN — DEXTROSE AND SODIUM CHLORIDE: 5; 900 INJECTION, SOLUTION INTRAVENOUS at 20:44

## 2024-03-26 RX ADMIN — KETOROLAC TROMETHAMINE 10 MG: 15 INJECTION, SOLUTION INTRAMUSCULAR; INTRAVENOUS at 20:01

## 2024-03-26 ASSESSMENT — ACTIVITIES OF DAILY LIVING (ADL)
ADLS_ACUITY_SCORE: 35
ADLS_ACUITY_SCORE: 35

## 2024-03-26 ASSESSMENT — COLUMBIA-SUICIDE SEVERITY RATING SCALE - C-SSRS
2. HAVE YOU ACTUALLY HAD ANY THOUGHTS OF KILLING YOURSELF IN THE PAST MONTH?: NO
1. IN THE PAST MONTH, HAVE YOU WISHED YOU WERE DEAD OR WISHED YOU COULD GO TO SLEEP AND NOT WAKE UP?: NO
6. HAVE YOU EVER DONE ANYTHING, STARTED TO DO ANYTHING, OR PREPARED TO DO ANYTHING TO END YOUR LIFE?: NO

## 2024-03-27 NOTE — DISCHARGE INSTRUCTIONS
RETURN TO THE EMERGENCY ROOM FOR THE FOLLOWING:    Severely worsened pain, fainting, concerning changes in behavior/confusion, or at anytime for any concern.    FOLLOW UP:    With your primary clinic if not improved over the next 2 weeks, or return to the ED at anytime for any concern.    TREATMENT RECOMMENDATIONS:    Zofran for nausea, as needed.    Ibuprofen 600 mg every six hours for pain (7 days duration).  Tylenol 1000 mg every six hours for pain (7 days duration).  Therefore, you can alternate these every three hours and do it safely.  ONLY take these medications if it is safe to do so given your medical history.    NURSE ADVICE LINE:  [phone] or [phone]

## 2024-03-27 NOTE — ED PROVIDER NOTES
HPI   Patient is a 33-year-old male presenting with nausea, vomiting, diarrhea.  Per medical chart, he does not have a significant history involving his abdomen or pelvis.  No history of Crohn's or ulcerative colitis.  He was last seen on 2/18/2024 with influenza A.  No recent antibiotics.    The patient describes onset of nausea with vomiting and diarrhea this afternoon.  He has thrown up twice.  He has multiple episodes of diarrhea.  Uncertain if there has been hematochezia or melena.  He has abdominal cramping.  He had chills but he took Tylenol and fever symptoms resolved.  No ongoing abdominal pain.  He feels lightheaded and dehydrated.  His wife has similar symptoms starting this afternoon.  His children started with symptoms this morning and yesterday.  They came from  where their nanny had similar symptoms.      Allergies:  No Known Allergies  Problem List:    There are no problems to display for this patient.     Past Medical History:    No past medical history on file.  Past Surgical History:    No past surgical history on file.  Family History:    No family history on file.  Social History:  Marital Status:  Single [1]  Social History     Tobacco Use    Smoking status: Former     Packs/day: 0.50     Years: 5.00     Additional pack years: 0.00     Total pack years: 2.50     Types: Cigarettes    Smokeless tobacco: Current     Types: Chew   Substance Use Topics    Alcohol use: Yes    Drug use: No      Medications:    ondansetron (ZOFRAN ODT) 4 MG ODT tab  albuterol (PROAIR HFA/PROVENTIL HFA/VENTOLIN HFA) 108 (90 Base) MCG/ACT inhaler  diphenhydrAMINE (BENADRYL) 25 MG tablet  fluticasone (FLOVENT HFA) 110 MCG/ACT inhaler  guaiFENesin-codeine (CHERATUSSIN AC) 100-10 MG/5ML solution  methylPREDNISolone (MEDROL DOSEPAK) 4 MG tablet therapy pack  montelukast (SINGULAIR) 10 MG tablet      Review of Systems   All other systems reviewed and are negative.      PE   BP: 120/78  Pulse: 107  Temp: 99.1  F (37.3  " C)  Resp: 20  Height: 170.2 cm (5' 7\")  Weight: 100.7 kg (222 lb)  SpO2: 98 %  Physical Exam  Vitals and nursing note reviewed.   Constitutional:       Comments: Tired appearing.  Cooperative and answering questions well.   HENT:      Head: Atraumatic.      Right Ear: External ear normal.      Left Ear: External ear normal.      Nose: Nose normal.      Mouth/Throat:      Mouth: Mucous membranes are dry.      Pharynx: Oropharynx is clear.   Eyes:      General: No scleral icterus.     Extraocular Movements: Extraocular movements intact.      Conjunctiva/sclera: Conjunctivae normal.      Pupils: Pupils are equal, round, and reactive to light.   Cardiovascular:      Rate and Rhythm: Normal rate.   Pulmonary:      Effort: Pulmonary effort is normal. No respiratory distress.   Abdominal:      Palpations: Abdomen is soft.      Comments: Generalized discomfort, no focal tenderness.   Musculoskeletal:         General: Normal range of motion.      Cervical back: Normal range of motion.   Skin:     General: Skin is warm and dry.   Neurological:      Mental Status: He is alert and oriented to person, place, and time.   Psychiatric:         Behavior: Behavior normal.         ED COURSE and MDM   1956.  Nausea with vomiting and diarrhea.  Likely infectious given the multiple family members and close contacts with similar symptoms.  Fluids for hydration.  Zofran and Toradol for symptoms.  Serum lab values pending.    2106.  Patient is improving.  Blood work results are encouraging.  No need for hospitalization at this time.  Patient agrees with the plan to be discharged home.  Nausea with vomiting and diarrhea likely infectious in origin, no stool no antibiotics at this time.  Zofran and culture at this time and NSAID/Tylenol for comfort at home.  Return for worsening as discussed.  Patient agrees with the plan.    Electronic medical chart reviewed, including medical problems, medications, medical allergies, social history.  " Recent hospitalizations and surgical procedures reviewed.  Recent clinic visits and consultations reviewed.  Recent labs and test results reviewed.  Nursing notes reviewed.    The patient, their parent if applicable, and/or their medical decision maker(s) and I have reviewed all of the available historical information, applicable PMH, physical exam findings, and objective diagnostic data gathered during this ED visit.  We then discussed all work-up options and then together agreed upon the course taken during this visit.  The ultimate disposition and plan was a cooperative decision made between myself and the patient, their parent if applicable, and/or their legal decision maker(s).  The risks and benefits of all decisions made during this visit were discussed to the best of my abilities given the circumstances, and all parties are understanding of the pertinent ramifications of these decisions.      LABS  Labs Ordered and Resulted from Time of ED Arrival to Time of ED Departure   COMPREHENSIVE METABOLIC PANEL - Abnormal       Result Value    Sodium 133 (*)     Potassium 4.0      Carbon Dioxide (CO2) 25      Anion Gap 9      Urea Nitrogen 14.2      Creatinine 0.99      GFR Estimate >90      Calcium 9.2      Chloride 99      Glucose 109 (*)     Alkaline Phosphatase 82      AST 22      ALT 32      Protein Total 6.7      Albumin 4.1      Bilirubin Total 0.5     CBC WITH PLATELETS AND DIFFERENTIAL - Abnormal    WBC Count 10.9      RBC Count 5.25      Hemoglobin 14.8      Hematocrit 45.0      MCV 86      MCH 28.2      MCHC 32.9      RDW 13.0      Platelet Count 246      % Neutrophils 89      % Lymphocytes 6      % Monocytes 4      % Eosinophils 1      % Basophils 0      % Immature Granulocytes 1      NRBCs per 100 WBC 0      Absolute Neutrophils 9.7 (*)     Absolute Lymphocytes 0.6 (*)     Absolute Monocytes 0.4      Absolute Eosinophils 0.1      Absolute Basophils 0.0      Absolute Immature Granulocytes 0.1       Absolute NRBCs 0.0         IMAGING  Images reviewed by me.  Radiology report also reviewed.  No orders to display       Procedures    Medications   dextrose 5% and 0.9% NaCl infusion ( Intravenous $New Bag 3/26/24 2044)   ondansetron (ZOFRAN) injection 4 mg (4 mg Intravenous $Given 3/26/24 1959)   sodium chloride 0.9% BOLUS 1,000 mL (0 mLs Intravenous Stopped 3/26/24 2044)   ketorolac (TORADOL) injection 10 mg (10 mg Intravenous $Given 3/26/24 2001)         IMPRESSION       ICD-10-CM    1. Nausea vomiting and diarrhea  R11.2     R19.7                Medication List        Started      ondansetron 4 MG ODT tab  Commonly known as: ZOFRAN ODT  4-8 mg, Oral, EVERY 8 HOURS PRN                                  Juan Carlos Choi MD  03/26/24 6412

## 2024-08-20 ENCOUNTER — HOSPITAL ENCOUNTER (EMERGENCY)
Facility: CLINIC | Age: 34
Discharge: HOME | End: 2024-08-20
Payer: COMMERCIAL

## 2024-08-20 ENCOUNTER — APPOINTMENT (OUTPATIENT)
Dept: GENERAL RADIOLOGY | Facility: CLINIC | Age: 34
End: 2024-08-20
Payer: COMMERCIAL

## 2024-08-20 VITALS
SYSTOLIC BLOOD PRESSURE: 131 MMHG | WEIGHT: 195 LBS | DIASTOLIC BLOOD PRESSURE: 84 MMHG | HEART RATE: 98 BPM | BODY MASS INDEX: 30.61 KG/M2 | TEMPERATURE: 99.1 F | HEIGHT: 67 IN | RESPIRATION RATE: 18 BRPM | OXYGEN SATURATION: 99 %

## 2024-08-20 DIAGNOSIS — R68.89 FLU-LIKE SYMPTOMS: ICD-10-CM

## 2024-08-20 LAB
ALBUMIN SERPL BCG-MCNC: 4.1 G/DL (ref 3.5–5.2)
ALP SERPL-CCNC: 98 U/L (ref 40–150)
ALT SERPL W P-5'-P-CCNC: 30 U/L (ref 0–70)
ANION GAP SERPL CALCULATED.3IONS-SCNC: 10 MMOL/L (ref 7–15)
AST SERPL W P-5'-P-CCNC: 24 U/L (ref 0–45)
BASOPHILS # BLD AUTO: 0.1 10E3/UL (ref 0–0.2)
BASOPHILS NFR BLD AUTO: 1 %
BILIRUB SERPL-MCNC: 0.2 MG/DL
BUN SERPL-MCNC: 10.2 MG/DL (ref 6–20)
CALCIUM SERPL-MCNC: 9.4 MG/DL (ref 8.8–10.4)
CHLORIDE SERPL-SCNC: 98 MMOL/L (ref 98–107)
CK SERPL-CCNC: 137 U/L (ref 39–308)
CREAT SERPL-MCNC: 1.05 MG/DL (ref 0.67–1.17)
EGFRCR SERPLBLD CKD-EPI 2021: >90 ML/MIN/1.73M2
EOSINOPHIL # BLD AUTO: 0.1 10E3/UL (ref 0–0.7)
EOSINOPHIL NFR BLD AUTO: 2 %
ERYTHROCYTE [DISTWIDTH] IN BLOOD BY AUTOMATED COUNT: 14.1 % (ref 10–15)
FLUAV RNA SPEC QL NAA+PROBE: NEGATIVE
FLUBV RNA RESP QL NAA+PROBE: NEGATIVE
GLUCOSE SERPL-MCNC: 92 MG/DL (ref 70–99)
HCO3 SERPL-SCNC: 28 MMOL/L (ref 22–29)
HCT VFR BLD AUTO: 45.6 % (ref 40–53)
HGB BLD-MCNC: 14.6 G/DL (ref 13.3–17.7)
IMM GRANULOCYTES # BLD: 0 10E3/UL
IMM GRANULOCYTES NFR BLD: 0 %
LYMPHOCYTES # BLD AUTO: 1 10E3/UL (ref 0.8–5.3)
LYMPHOCYTES NFR BLD AUTO: 16 %
MCH RBC QN AUTO: 27.1 PG (ref 26.5–33)
MCHC RBC AUTO-ENTMCNC: 32 G/DL (ref 31.5–36.5)
MCV RBC AUTO: 85 FL (ref 78–100)
MONOCYTES # BLD AUTO: 0.4 10E3/UL (ref 0–1.3)
MONOCYTES NFR BLD AUTO: 7 %
NEUTROPHILS # BLD AUTO: 4.6 10E3/UL (ref 1.6–8.3)
NEUTROPHILS NFR BLD AUTO: 75 %
NRBC # BLD AUTO: 0 10E3/UL
NRBC BLD AUTO-RTO: 0 /100
PLATELET # BLD AUTO: 208 10E3/UL (ref 150–450)
POTASSIUM SERPL-SCNC: 3.6 MMOL/L (ref 3.4–5.3)
PROT SERPL-MCNC: 6.8 G/DL (ref 6.4–8.3)
RBC # BLD AUTO: 5.38 10E6/UL (ref 4.4–5.9)
RSV RNA SPEC NAA+PROBE: NEGATIVE
SARS-COV-2 RNA RESP QL NAA+PROBE: NEGATIVE
SODIUM SERPL-SCNC: 136 MMOL/L (ref 135–145)
WBC # BLD AUTO: 6.2 10E3/UL (ref 4–11)

## 2024-08-20 PROCEDURE — 99284 EMERGENCY DEPT VISIT MOD MDM: CPT

## 2024-08-20 PROCEDURE — 87637 SARSCOV2&INF A&B&RSV AMP PRB: CPT

## 2024-08-20 PROCEDURE — 250N000011 HC RX IP 250 OP 636

## 2024-08-20 PROCEDURE — 80053 COMPREHEN METABOLIC PANEL: CPT

## 2024-08-20 PROCEDURE — 85004 AUTOMATED DIFF WBC COUNT: CPT

## 2024-08-20 PROCEDURE — 36415 COLL VENOUS BLD VENIPUNCTURE: CPT

## 2024-08-20 PROCEDURE — 96361 HYDRATE IV INFUSION ADD-ON: CPT

## 2024-08-20 PROCEDURE — 250N000013 HC RX MED GY IP 250 OP 250 PS 637

## 2024-08-20 PROCEDURE — 99284 EMERGENCY DEPT VISIT MOD MDM: CPT | Mod: 25

## 2024-08-20 PROCEDURE — 258N000003 HC RX IP 258 OP 636

## 2024-08-20 PROCEDURE — 96374 THER/PROPH/DIAG INJ IV PUSH: CPT

## 2024-08-20 PROCEDURE — 82550 ASSAY OF CK (CPK): CPT

## 2024-08-20 PROCEDURE — 71046 X-RAY EXAM CHEST 2 VIEWS: CPT

## 2024-08-20 RX ORDER — ACETAMINOPHEN 325 MG/1
975 TABLET ORAL ONCE
Status: COMPLETED | OUTPATIENT
Start: 2024-08-20 | End: 2024-08-20

## 2024-08-20 RX ORDER — KETOROLAC TROMETHAMINE 15 MG/ML
15 INJECTION, SOLUTION INTRAMUSCULAR; INTRAVENOUS ONCE
Status: COMPLETED | OUTPATIENT
Start: 2024-08-20 | End: 2024-08-20

## 2024-08-20 RX ADMIN — SODIUM CHLORIDE, POTASSIUM CHLORIDE, SODIUM LACTATE AND CALCIUM CHLORIDE 1000 ML: 600; 310; 30; 20 INJECTION, SOLUTION INTRAVENOUS at 20:03

## 2024-08-20 RX ADMIN — KETOROLAC TROMETHAMINE 15 MG: 15 INJECTION, SOLUTION INTRAMUSCULAR; INTRAVENOUS at 20:11

## 2024-08-20 RX ADMIN — ACETAMINOPHEN 975 MG: 325 TABLET, FILM COATED ORAL at 20:10

## 2024-08-20 ASSESSMENT — ACTIVITIES OF DAILY LIVING (ADL)
ADLS_ACUITY_SCORE: 33
ADLS_ACUITY_SCORE: 35

## 2024-08-20 ASSESSMENT — COLUMBIA-SUICIDE SEVERITY RATING SCALE - C-SSRS
1. IN THE PAST MONTH, HAVE YOU WISHED YOU WERE DEAD OR WISHED YOU COULD GO TO SLEEP AND NOT WAKE UP?: NO
6. HAVE YOU EVER DONE ANYTHING, STARTED TO DO ANYTHING, OR PREPARED TO DO ANYTHING TO END YOUR LIFE?: NO
2. HAVE YOU ACTUALLY HAD ANY THOUGHTS OF KILLING YOURSELF IN THE PAST MONTH?: NO

## 2024-08-20 NOTE — ED TRIAGE NOTES
Pt reports fatigue and body aches for 2 days. Pt said he worked 85 hrs in one week in the heat. Pt said he tries to drink 10-12 bottles of water with some electrolytes. Pt said he is prone to sepsis.      Triage Assessment (Adult)       Row Name 08/20/24 8501          Triage Assessment    Airway WDL WDL        Respiratory WDL    Respiratory WDL WDL        Cardiac WDL    Cardiac WDL WDL        Cognitive/Neuro/Behavioral WDL    Cognitive/Neuro/Behavioral WDL WDL

## 2024-08-21 NOTE — DISCHARGE INSTRUCTIONS
For pain and fever you can take Tylenol 500-1000mg and/or Ibuprofen 600mg every 6 hours as needed. Do not take more than 3200mg Ibuprofen or 4000mg Tylenol in a 24 hour period.    For congestion/sinus pressure you should start using Flonase once daily. This take 3-5 days of consistent use to start working, and it will decrease congestion and inflammation. You can also do nasal saline spray or rinses as needed to clear congestion.     Drink plenty of fluids to prevent dehydration. It is important to rest to allow your body to fight this infection.     Follow up in clinic if your symptoms are not improving over the next 5-7 days, or if your symptoms initially improve but then worsen. Return to the ER immediately if you develop shortness of breath, chest pain, severe headache, uncontrollable vomiting, abdominal pain, burning with urination, or if other concerning symptoms develop.

## 2024-08-21 NOTE — ED PROVIDER NOTES
History     Chief Complaint   Patient presents with    Fatigue     2 days    Generalized Body Aches     2 days       Gianluca Ortiz is a 33 year old male with significant pmhx of asthma who presents for evaluation of flu-like symptoms. Patient states he developed generalized body aches, fatigue, hot flashes and cold chills two days ago that have persisted. He endorses accompanying mild sore throat, generalized headache, facial pressure/congestion, and feeling of dehydration (dry lips, dry hands). He states he has been working 14-18h days in the hot sun and thought it could be related to strenuous hours in the heat. He increased water and electrolyte intake without improvement. He also endorses focal anterior R chest wall pain that is worse with deep inspiration. He denies exertional SOA or SOA at rest. His son has been sick for the past couple days but he is not sure what with. He denies associated vision changes, neck stiffness, cough, rhinorrhea, ear pain, n/v, abdominal pain, dysuria, constipation, hematochezia, melena. Last dose of Tylenol was 1430.     Allergies:  No Known Allergies    Problem List:    There are no problems to display for this patient.       Past Medical History:    No past medical history on file.    Past Surgical History:    No past surgical history on file.    Family History:    No family history on file.    Social History:  Marital Status:  Single [1]  Social History     Tobacco Use    Smoking status: Former     Current packs/day: 0.50     Average packs/day: 0.5 packs/day for 5.0 years (2.5 ttl pk-yrs)     Types: Cigarettes    Smokeless tobacco: Current     Types: Chew   Substance Use Topics    Alcohol use: Yes    Drug use: No        Medications:    albuterol (PROAIR HFA/PROVENTIL HFA/VENTOLIN HFA) 108 (90 Base) MCG/ACT inhaler  diphenhydrAMINE (BENADRYL) 25 MG tablet  fluticasone (FLOVENT HFA) 110 MCG/ACT inhaler  guaiFENesin-codeine (CHERATUSSIN AC) 100-10 MG/5ML  "solution  methylPREDNISolone (MEDROL DOSEPAK) 4 MG tablet therapy pack  montelukast (SINGULAIR) 10 MG tablet  ondansetron (ZOFRAN ODT) 4 MG ODT tab          Physical Exam   BP: 131/84  Pulse: 98  Temp: 99.1  F (37.3  C)  Resp: 18  Height: 170.2 cm (5' 7\")  Weight: 88.5 kg (195 lb)  SpO2: 99 %      Physical Exam  Constitutional:       Appearance: He is ill-appearing. He is not toxic-appearing or diaphoretic.   HENT:      Head: Normocephalic and atraumatic.      Nose: Nose normal. No congestion or rhinorrhea.      Right Sinus: No maxillary sinus tenderness or frontal sinus tenderness.      Left Sinus: No maxillary sinus tenderness or frontal sinus tenderness.      Mouth/Throat:      Mouth: Mucous membranes are moist.      Pharynx: Oropharynx is clear. Posterior oropharyngeal erythema present. No oropharyngeal exudate.   Eyes:      Extraocular Movements: Extraocular movements intact.      Conjunctiva/sclera: Conjunctivae normal.      Pupils: Pupils are equal, round, and reactive to light.   Cardiovascular:      Rate and Rhythm: Normal rate and regular rhythm.      Heart sounds: Normal heart sounds.   Pulmonary:      Effort: Pulmonary effort is normal.      Breath sounds: Normal breath sounds. No wheezing, rhonchi or rales.   Chest:      Chest wall: No tenderness.   Abdominal:      Palpations: Abdomen is soft.      Tenderness: There is no abdominal tenderness.   Musculoskeletal:         General: Normal range of motion.      Cervical back: Normal range of motion. No rigidity.   Skin:     General: Skin is warm.      Findings: No rash.   Neurological:      General: No focal deficit present.      Mental Status: He is oriented to person, place, and time.   Psychiatric:         Mood and Affect: Mood normal.         Behavior: Behavior normal.         ED Course        Procedures                    Results for orders placed or performed during the hospital encounter of 08/20/24 (from the past 24 hour(s))   Symptomatic Influenza " A/B, RSV, & SARS-CoV2 PCR (COVID-19) Nose    Specimen: Nose; Swab   Result Value Ref Range    Influenza A PCR Negative Negative    Influenza B PCR Negative Negative    RSV PCR Negative Negative    SARS CoV2 PCR Negative Negative    Narrative    Testing was performed using the Xpert Xpress CoV2/Flu/RSV Assay on the [employer] GeneXpert Instrument. This test should be ordered for the detection of SARS-CoV-2, influenza, and RSV viruses in individuals who meet clinical and/or epidemiological criteria. Test performance is unknown in asymptomatic patients. This test is for in vitro diagnostic use under the FDA EUA for laboratories certified under CLIA to perform high or moderate complexity testing. This test has not been FDA cleared or approved. A negative result does not rule out the presence of PCR inhibitors in the specimen or target RNA in concentration below the limit of detection for the assay. If only one viral target is positive but coinfection with multiple targets is suspected, the sample should be re-tested with another FDA cleared, approved, or authorized test, if coinfection would change clinical management. This test was validated by the [facility]. These laboratories are certified under the Clinical Laboratory Improvement Amendments of 1988 (CLIA-88) as qualified to perform high complexity laboratory testing.   CBC with platelets differential    Narrative    The following orders were created for panel order CBC with platelets differential.  Procedure                               Abnormality         Status                     ---------                               -----------         ------                     CBC with platelets and d...[[id number]]                      Final result                 Please view results for these tests on the individual orders.   Comprehensive metabolic panel   Result Value Ref Range    Sodium 136 135 - 145 mmol/L    Potassium 3.6 3.4 - 5.3 mmol/L    Carbon  Dioxide (CO2) 28 22 - 29 mmol/L    Anion Gap 10 7 - 15 mmol/L    Urea Nitrogen 10.2 6.0 - 20.0 mg/dL    Creatinine 1.05 0.67 - 1.17 mg/dL    GFR Estimate >90 >60 mL/min/1.73m2    Calcium 9.4 8.8 - 10.4 mg/dL    Chloride 98 98 - 107 mmol/L    Glucose 92 70 - 99 mg/dL    Alkaline Phosphatase 98 40 - 150 U/L    AST 24 0 - 45 U/L    ALT 30 0 - 70 U/L    Protein Total 6.8 6.4 - 8.3 g/dL    Albumin 4.1 3.5 - 5.2 g/dL    Bilirubin Total 0.2 <=1.2 mg/dL   CK total   Result Value Ref Range     39 - 308 U/L   CBC with platelets and differential   Result Value Ref Range    WBC Count 6.2 4.0 - 11.0 10e3/uL    RBC Count 5.38 4.40 - 5.90 10e6/uL    Hemoglobin 14.6 13.3 - 17.7 g/dL    Hematocrit 45.6 40.0 - 53.0 %    MCV 85 78 - 100 fL    MCH 27.1 26.5 - 33.0 pg    MCHC 32.0 31.5 - 36.5 g/dL    RDW 14.1 10.0 - 15.0 %    Platelet Count 208 150 - 450 10e3/uL    % Neutrophils 75 %    % Lymphocytes 16 %    % Monocytes 7 %    % Eosinophils 2 %    % Basophils 1 %    % Immature Granulocytes 0 %    NRBCs per 100 WBC 0 <1 /100    Absolute Neutrophils 4.6 1.6 - 8.3 10e3/uL    Absolute Lymphocytes 1.0 0.8 - 5.3 10e3/uL    Absolute Monocytes 0.4 0.0 - 1.3 10e3/uL    Absolute Eosinophils 0.1 0.0 - 0.7 10e3/uL    Absolute Basophils 0.1 0.0 - 0.2 10e3/uL    Absolute Immature Granulocytes 0.0 <=0.4 10e3/uL    Absolute NRBCs 0.0 10e3/uL   XR Chest 2 Views    Narrative    EXAM: XR CHEST 2 VIEWS  LOCATION: [facility]  DATE: 8/20/2024    INDICATION: R lower anterior chest pain  COMPARISON: None.      Impression    IMPRESSION: Low lung volumes without evidence of acute cardiopulmonary disease.       Medications   lactated ringers BOLUS 1,000 mL (1,000 mLs Intravenous $New Bag 8/20/24 2003)   ketorolac (TORADOL) injection 15 mg (15 mg Intravenous $Given 8/20/24 2011)   acetaminophen (TYLENOL) tablet 975 mg (975 mg Oral $Given 8/20/24 2010)       Assessments & Plan (with Medical Decision Making)     I have reviewed the  "nursing notes.    I have reviewed the findings, diagnosis, plan and need for follow up with the patient.    Medical Decision Making  Gianluca Ortiz is a 33 year old male with significant pmhx of asthma who presents for evaluation of flu-like symptoms.  Differential diagnoses include influenza, COVID, other viral illness, pneumonia, rhabdomyolysis, other occult infection. Vital signs grossly unremarkable. Patient is normotensive, afebrile, he is not tachycardic, and he is satting 99% on RA with a regular respiratory rate and effort.    Of note, triage note stated patient said he is \"prone to sepsis,\" but he clarified with me that he is prone to sinusitis, not sepsis.     On examination patient is ill-appearing but non-toxic. He is alert, oriented, and speaking in full sentences without difficulty. No neck stiffness/rigidity to suggest meningitis or encephalitis. Oropharyngeal exam with dry lips but moist mucous membranes, mild posterior erythema, no exudate or tonsillar/uvular swelling.  No facial sinus tenderness.  Heart sounds normal. Lungs CTAB. No reproducible chest tenderness with palpation. No abdominal tenderness or distension. Patient denied rashes or recent tick exposure.  CMP negative for renal dysfunction, electrolyte abnormality, liver dysfunction.  CBC negative for leukocytosis or anemia.  Total CK is within normal limits.  COVID and influenza testing came back negative.  Chest x-ray negative for acute cardiopulmonary abnormality.  Considered further workup to evaluate for other sources of acute bacterial infection, but patient's vital signs are reassuring and he is afebrile.  He also does not have any abnormalities on lab workup to suggest significant infection or inflammation.  He denies urinary symptoms that would be suspicious for UTI.  Overall his symptoms of bodyaches, fever/chills, congestion, mild headache are consistent with viral infection.    Patient reported some symptomatic improvement " following IV Toradol, p.o. Tylenol, IV fluids.  Recommended symptomatic management with Tylenol, ibuprofen as needed, Flonase, nasal saline rinses.  Encouraged increased fluid intake and rest.  He was instructed to follow-up in the clinic if his symptoms persist past 7-10 days, or if he experiences a period of improvement followed by worsening symptoms.  He was instructed to return to the ER immediately if you develop severe headache, vision changes, facial swelling, chest pain, shortness of breath, uncontrollable vomiting, abdominal pain, urinary symptoms, or if other concerning symptoms develop.  Patient voiced understanding of the plan and had no further questions.      New Prescriptions    No medications on file       Final diagnoses:   Flu-like symptoms       Hermila Noonan PA-C  6/8/2024   [facility] EMERGENCY DEPT     Hermila Noonan PA-C  08/20/24 0955

## 2025-01-05 ENCOUNTER — OFFICE VISIT (OUTPATIENT)
Dept: URGENT CARE | Facility: URGENT CARE | Age: 35
End: 2025-01-05
Payer: COMMERCIAL

## 2025-01-05 VITALS
SYSTOLIC BLOOD PRESSURE: 117 MMHG | BODY MASS INDEX: 31.32 KG/M2 | WEIGHT: 200 LBS | TEMPERATURE: 97.9 F | HEART RATE: 66 BPM | DIASTOLIC BLOOD PRESSURE: 78 MMHG | OXYGEN SATURATION: 99 % | RESPIRATION RATE: 15 BRPM

## 2025-01-05 DIAGNOSIS — Z91.09 ENVIRONMENTAL ALLERGIES: ICD-10-CM

## 2025-01-05 DIAGNOSIS — S00.411A EAR ABRASION, RIGHT, INITIAL ENCOUNTER: Primary | ICD-10-CM

## 2025-01-05 PROBLEM — R73.03 PREDIABETES: Status: ACTIVE | Noted: 2024-10-22

## 2025-01-05 PROBLEM — R25.2 TRISMUS: Status: ACTIVE | Noted: 2020-09-28

## 2025-01-05 PROBLEM — J01.01 ACUTE RECURRENT MAXILLARY SINUSITIS: Status: ACTIVE | Noted: 2023-10-02

## 2025-01-05 PROBLEM — E78.2 MIXED HYPERLIPIDEMIA: Status: ACTIVE | Noted: 2024-10-22

## 2025-01-05 PROBLEM — Z87.09 HISTORY OF NASAL POLYP: Status: ACTIVE | Noted: 2024-03-11

## 2025-01-05 PROBLEM — F41.8 SITUATIONAL ANXIETY: Status: ACTIVE | Noted: 2020-09-28

## 2025-01-05 PROBLEM — J45.20 MILD INTERMITTENT ASTHMA WITHOUT COMPLICATION: Status: ACTIVE | Noted: 2018-12-21

## 2025-01-05 PROBLEM — F51.3 SOMNAMBULISM: Chronic | Status: ACTIVE | Noted: 2018-12-21

## 2025-01-05 PROBLEM — I47.10 PAROXYSMAL SUPRAVENTRICULAR TACHYCARDIA: Chronic | Status: ACTIVE | Noted: 2018-12-28

## 2025-01-05 PROBLEM — J31.0 CHRONIC RHINITIS: Status: ACTIVE | Noted: 2025-01-05

## 2025-01-05 PROCEDURE — 99213 OFFICE O/P EST LOW 20 MIN: CPT

## 2025-01-05 RX ORDER — FAMOTIDINE 20 MG/1
20 TABLET, FILM COATED ORAL
COMMUNITY
Start: 2024-09-23

## 2025-01-05 RX ORDER — PHENTERMINE HYDROCHLORIDE 37.5 MG/1
37.5 TABLET ORAL
COMMUNITY
Start: 2024-11-20

## 2025-01-05 RX ORDER — FLUTICASONE PROPIONATE 50 MCG
SPRAY, SUSPENSION (ML) NASAL
COMMUNITY
Start: 2024-09-23

## 2025-01-05 RX ORDER — METHOCARBAMOL 500 MG/1
500 TABLET, FILM COATED ORAL
COMMUNITY
Start: 2024-08-21

## 2025-01-05 NOTE — PROGRESS NOTES
URGENT CARE  Assessment & Plan   Assessment:   Gianluca Ortiz is a 34 year old male who's clinical presentation today is consistent with:   1. Ear abrasion, right, initial encounter    Plan:  Abrasion noted to the canal of the patient's right ear, appears scabbed at this time, no signs of infection, suspect scratched; recommend patient leave scab alone for a few days to a week, then use a Vaseline dipped Q-tip and gently remove scab, can use Tylenol ibuprofen for pain, follow-up as needed, sooner if symptoms worsen, return precautions given    No alarm signs or symptoms present   Differential Diagnoses for this patient's chief complaint that I considered include:  AOM, OME, otitis externa, viral URI, other bacterial pathology, ceruminosis, eustachian tube dysfunction, meniere's , myringitis, mastoiditis, Labyrinthitis, vestibular pathology, Sensory or conductive hearing loss, tinnitus      Patient is agreeable to treatment plan and state they will follow-up if symptoms do not improve and/or if symptoms worsen   see patient's AVS 'monitor for' section for specific patient instructions given and discussed regarding what to watch for and when to follow up    APURVA Gusman [facility]      ______________________________________________________________________      Subjective     HPI: Gianluca Ortiz  is a 34 year old  male who presents today for evaluation the following concerns:   Patient presents today endorsing his right ear was bleeding for the last 2 days, patient states it stopped bleeding today, patient states he was cleaning his ears after a shower and noticed blood on the Q-tip.  Patient denies any overt pain or fevers.    Review of Systems:  Pertinent review of systems as reflected in HPI, otherwise negative.     Objective    Physical Exam:  Vitals:    01/05/25 1026   BP: 117/78   Pulse: 66   Resp: 15   Temp: 97.9  F (36.6  C)   TempSrc: Tympanic   SpO2: 99%    Weight: 90.7 kg (200 lb)     General:  alert and oriented, no acute distress, non ill-appearing   Vital signs reviewed: afebrile and normotensive    EARS: TMs intact, translucent gray in color with normal landmarks present no erythema  or bulging tympanic membrane  Left Canal without swelling, cerumen or drainage/discharge, no tenderness to palpation , no foreign bodies present   right Canal without swelling, cerumen , no tenderness to palpation , no foreign bodies present    Scab on bottom of canal, dried blood, no active bleeding   ______________________________________________________________________    I explained my diagnostic considerations and recommendations to the patient, who voiced understanding and agreement with the treatment plan.   All questions were answered.   We discussed potential side effects, risks and benefits of any prescribed or recommended therapies, as well as expectations for response to treatments.  Please see AVS for any patient instructions & handouts given.   Patient was advised to contact the Nurse Care Line, their Primary Care provider, Urgent Care, or the Emergency Department if there are new or worsening symptoms, or call 911 for emergencies.

## 2025-02-01 ENCOUNTER — HEALTH MAINTENANCE LETTER (OUTPATIENT)
Age: 35
End: 2025-02-01